# Patient Record
Sex: MALE | HISPANIC OR LATINO | Employment: OTHER | URBAN - METROPOLITAN AREA
[De-identification: names, ages, dates, MRNs, and addresses within clinical notes are randomized per-mention and may not be internally consistent; named-entity substitution may affect disease eponyms.]

---

## 2023-06-15 ENCOUNTER — OFFICE VISIT (OUTPATIENT)
Dept: PRIMARY CARE | Facility: CLINIC | Age: 48
End: 2023-06-15
Payer: COMMERCIAL

## 2023-06-15 VITALS
DIASTOLIC BLOOD PRESSURE: 88 MMHG | WEIGHT: 226 LBS | SYSTOLIC BLOOD PRESSURE: 128 MMHG | HEART RATE: 112 BPM | OXYGEN SATURATION: 94 % | BODY MASS INDEX: 36.32 KG/M2 | TEMPERATURE: 97.5 F | HEIGHT: 66 IN | RESPIRATION RATE: 16 BRPM

## 2023-06-15 DIAGNOSIS — R06.83 SNORING: ICD-10-CM

## 2023-06-15 DIAGNOSIS — E66.09 CLASS 2 OBESITY DUE TO EXCESS CALORIES WITHOUT SERIOUS COMORBIDITY WITH BODY MASS INDEX (BMI) OF 36.0 TO 36.9 IN ADULT: ICD-10-CM

## 2023-06-15 DIAGNOSIS — R20.2 TINGLING OF LEFT UPPER EXTREMITY: Primary | ICD-10-CM

## 2023-06-15 DIAGNOSIS — Z79.899 MEDICAL MARIJUANA USE: ICD-10-CM

## 2023-06-15 DIAGNOSIS — Z00.00 HEALTHCARE MAINTENANCE: ICD-10-CM

## 2023-06-15 DIAGNOSIS — G89.29 CHRONIC PAIN OF LEFT LOWER EXTREMITY: ICD-10-CM

## 2023-06-15 DIAGNOSIS — Z13.220 SCREENING, LIPID: ICD-10-CM

## 2023-06-15 DIAGNOSIS — Z12.5 SCREENING FOR PROSTATE CANCER: ICD-10-CM

## 2023-06-15 DIAGNOSIS — M79.605 CHRONIC PAIN OF LEFT LOWER EXTREMITY: ICD-10-CM

## 2023-06-15 DIAGNOSIS — R40.0 DAYTIME SOMNOLENCE: ICD-10-CM

## 2023-06-15 PROBLEM — K21.9 GASTROESOPHAGEAL REFLUX DISEASE WITHOUT ESOPHAGITIS: Status: ACTIVE | Noted: 2022-10-20

## 2023-06-15 PROBLEM — E66.812 CLASS 2 OBESITY DUE TO EXCESS CALORIES WITHOUT SERIOUS COMORBIDITY WITH BODY MASS INDEX (BMI) OF 36.0 TO 36.9 IN ADULT: Status: ACTIVE | Noted: 2023-06-15

## 2023-06-15 PROCEDURE — 93000 ELECTROCARDIOGRAM COMPLETE: CPT | Performed by: FAMILY MEDICINE

## 2023-06-15 PROCEDURE — 99203 OFFICE O/P NEW LOW 30 MIN: CPT | Performed by: FAMILY MEDICINE

## 2023-06-15 PROCEDURE — 3008F BODY MASS INDEX DOCD: CPT | Performed by: FAMILY MEDICINE

## 2023-06-15 PROCEDURE — 1036F TOBACCO NON-USER: CPT | Performed by: FAMILY MEDICINE

## 2023-06-15 RX ORDER — MULTIVITAMIN
1 TABLET ORAL DAILY
COMMUNITY

## 2023-06-15 RX ORDER — NAPROXEN 500 MG/1
500 TABLET ORAL 2 TIMES DAILY PRN
Qty: 40 TABLET | Refills: 0 | Status: SHIPPED | OUTPATIENT
Start: 2023-06-15 | End: 2023-09-01 | Stop reason: ALTCHOICE

## 2023-06-15 ASSESSMENT — ENCOUNTER SYMPTOMS
CHEST TIGHTNESS: 0
BACK PAIN: 0
BLOOD IN STOOL: 0
DIZZINESS: 0
SHORTNESS OF BREATH: 0
ABDOMINAL DISTENTION: 0
ARTHRALGIAS: 0
FEVER: 0
ADENOPATHY: 0
CHILLS: 0
NERVOUS/ANXIOUS: 0
HEADACHES: 0
CONSTIPATION: 0
DIARRHEA: 0
EYE PAIN: 0
DYSURIA: 0
ABDOMINAL PAIN: 0
DYSPHORIC MOOD: 0
COUGH: 0
WEAKNESS: 0
EYE REDNESS: 0
SORE THROAT: 0
BRUISES/BLEEDS EASILY: 0
APPETITE CHANGE: 0
DIFFICULTY URINATING: 0
FATIGUE: 0
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
## 2023-06-15 NOTE — PROGRESS NOTES
"Subjective   Patient ID: Jose Osei is a 47 y.o. male who presents for New Patient Visit.    HPI     Review of Systems    Objective   /84   Pulse (!) 112   Temp 36.4 °C (97.5 °F)   Resp 16   Ht 1.676 m (5' 6\")   Wt 103 kg (226 lb)   SpO2 94%   BMI 36.48 kg/m²     Physical Exam    Assessment/Plan          "

## 2023-06-23 ENCOUNTER — LAB (OUTPATIENT)
Dept: LAB | Facility: LAB | Age: 48
End: 2023-06-23
Payer: COMMERCIAL

## 2023-06-23 DIAGNOSIS — Z00.00 HEALTHCARE MAINTENANCE: ICD-10-CM

## 2023-06-23 DIAGNOSIS — Z13.220 SCREENING, LIPID: ICD-10-CM

## 2023-06-23 DIAGNOSIS — E78.00 ELEVATED LDL CHOLESTEROL LEVEL: ICD-10-CM

## 2023-06-23 DIAGNOSIS — R73.03 PREDIABETES: ICD-10-CM

## 2023-06-23 DIAGNOSIS — R40.0 DAYTIME SOMNOLENCE: ICD-10-CM

## 2023-06-23 DIAGNOSIS — Z12.5 SCREENING FOR PROSTATE CANCER: ICD-10-CM

## 2023-06-23 DIAGNOSIS — R31.21 ASYMPTOMATIC MICROSCOPIC HEMATURIA: Primary | ICD-10-CM

## 2023-06-23 LAB
ALANINE AMINOTRANSFERASE (SGPT) (U/L) IN SER/PLAS: 26 U/L (ref 10–52)
ALBUMIN (G/DL) IN SER/PLAS: 4.2 G/DL (ref 3.4–5)
ALKALINE PHOSPHATASE (U/L) IN SER/PLAS: 84 U/L (ref 33–120)
ANION GAP IN SER/PLAS: 13 MMOL/L (ref 10–20)
APPEARANCE, URINE: CLEAR
ASPARTATE AMINOTRANSFERASE (SGOT) (U/L) IN SER/PLAS: 19 U/L (ref 9–39)
BILIRUBIN TOTAL (MG/DL) IN SER/PLAS: 0.6 MG/DL (ref 0–1.2)
BILIRUBIN, URINE: NEGATIVE
BLOOD, URINE: ABNORMAL
CALCIUM (MG/DL) IN SER/PLAS: 9.3 MG/DL (ref 8.6–10.6)
CARBON DIOXIDE, TOTAL (MMOL/L) IN SER/PLAS: 26 MMOL/L (ref 21–32)
CHLORIDE (MMOL/L) IN SER/PLAS: 104 MMOL/L (ref 98–107)
CHOLESTEROL (MG/DL) IN SER/PLAS: 208 MG/DL (ref 0–199)
CHOLESTEROL IN HDL (MG/DL) IN SER/PLAS: 43.7 MG/DL
CHOLESTEROL/HDL RATIO: 4.8
COBALAMIN (VITAMIN B12) (PG/ML) IN SER/PLAS: 734 PG/ML (ref 211–911)
COLOR, URINE: YELLOW
CREATININE (MG/DL) IN SER/PLAS: 1.05 MG/DL (ref 0.5–1.3)
ERYTHROCYTE DISTRIBUTION WIDTH (RATIO) BY AUTOMATED COUNT: 13.4 % (ref 11.5–14.5)
ERYTHROCYTE MEAN CORPUSCULAR HEMOGLOBIN CONCENTRATION (G/DL) BY AUTOMATED: 31.7 G/DL (ref 32–36)
ERYTHROCYTE MEAN CORPUSCULAR VOLUME (FL) BY AUTOMATED COUNT: 92 FL (ref 80–100)
ERYTHROCYTES (10*6/UL) IN BLOOD BY AUTOMATED COUNT: 4.65 X10E12/L (ref 4.5–5.9)
GFR MALE: 87 ML/MIN/1.73M2
GLUCOSE (MG/DL) IN SER/PLAS: 101 MG/DL (ref 74–99)
GLUCOSE, URINE: NEGATIVE MG/DL
HEMATOCRIT (%) IN BLOOD BY AUTOMATED COUNT: 42.6 % (ref 41–52)
HEMOGLOBIN (G/DL) IN BLOOD: 13.5 G/DL (ref 13.5–17.5)
HEPATITIS C VIRUS AB PRESENCE IN SERUM: NONREACTIVE
KETONES, URINE: NEGATIVE MG/DL
LDL: 145 MG/DL (ref 0–99)
LEUKOCYTE ESTERASE, URINE: NEGATIVE
LEUKOCYTES (10*3/UL) IN BLOOD BY AUTOMATED COUNT: 5.8 X10E9/L (ref 4.4–11.3)
MUCUS, URINE: NORMAL /LPF
NITRITE, URINE: NEGATIVE
NRBC (PER 100 WBCS) BY AUTOMATED COUNT: 0 /100 WBC (ref 0–0)
PH, URINE: 6 (ref 5–8)
PLATELETS (10*3/UL) IN BLOOD AUTOMATED COUNT: 371 X10E9/L (ref 150–450)
POTASSIUM (MMOL/L) IN SER/PLAS: 4.5 MMOL/L (ref 3.5–5.3)
PROSTATE SPECIFIC ANTIGEN,SCREEN: 1.02 NG/ML (ref 0–4)
PROTEIN TOTAL: 7.2 G/DL (ref 6.4–8.2)
PROTEIN, URINE: NEGATIVE MG/DL
RBC, URINE: 3 /HPF (ref 0–5)
SODIUM (MMOL/L) IN SER/PLAS: 138 MMOL/L (ref 136–145)
SPECIFIC GRAVITY, URINE: 1.02 (ref 1–1.03)
THYROTROPIN (MIU/L) IN SER/PLAS BY DETECTION LIMIT <= 0.05 MIU/L: 1.27 MIU/L (ref 0.44–3.98)
TRIGLYCERIDE (MG/DL) IN SER/PLAS: 98 MG/DL (ref 0–149)
UREA NITROGEN (MG/DL) IN SER/PLAS: 14 MG/DL (ref 6–23)
UROBILINOGEN, URINE: <2 MG/DL (ref 0–1.9)
VLDL: 20 MG/DL (ref 0–40)
WBC, URINE: 1 /HPF (ref 0–5)

## 2023-06-23 PROCEDURE — 82607 VITAMIN B-12: CPT

## 2023-06-23 PROCEDURE — 85027 COMPLETE CBC AUTOMATED: CPT

## 2023-06-23 PROCEDURE — 80053 COMPREHEN METABOLIC PANEL: CPT

## 2023-06-23 PROCEDURE — 84153 ASSAY OF PSA TOTAL: CPT

## 2023-06-23 PROCEDURE — 81001 URINALYSIS AUTO W/SCOPE: CPT

## 2023-06-23 PROCEDURE — 86803 HEPATITIS C AB TEST: CPT

## 2023-06-23 PROCEDURE — 84443 ASSAY THYROID STIM HORMONE: CPT

## 2023-06-23 PROCEDURE — 36415 COLL VENOUS BLD VENIPUNCTURE: CPT

## 2023-06-23 PROCEDURE — 80061 LIPID PANEL: CPT

## 2023-06-26 ENCOUNTER — TELEPHONE (OUTPATIENT)
Dept: PRIMARY CARE | Facility: CLINIC | Age: 48
End: 2023-06-26
Payer: COMMERCIAL

## 2023-06-26 NOTE — TELEPHONE ENCOUNTER
----- Message from Geoff Lora MD sent at 6/23/2023  3:03 PM EDT -----  FBS and LDL are elevated - recommend low carb diet, healthy snacking and regular exercise. There is low level of blood detectable in urine, recommend repeat early morning UA. Other labs look good.   ----- Message -----  From: Lab, Background User  Sent: 6/23/2023  12:43 PM EDT  To: Geoff Lora MD

## 2023-07-07 ENCOUNTER — LAB (OUTPATIENT)
Dept: LAB | Facility: LAB | Age: 48
End: 2023-07-07
Payer: COMMERCIAL

## 2023-07-07 DIAGNOSIS — R31.21 ASYMPTOMATIC MICROSCOPIC HEMATURIA: ICD-10-CM

## 2023-07-07 LAB
APPEARANCE, URINE: CLEAR
BILIRUBIN, URINE: NEGATIVE
BLOOD, URINE: NEGATIVE
COLOR, URINE: YELLOW
GLUCOSE, URINE: NEGATIVE MG/DL
KETONES, URINE: NEGATIVE MG/DL
LEUKOCYTE ESTERASE, URINE: NEGATIVE
NITRITE, URINE: NEGATIVE
PH, URINE: 5 (ref 5–8)
PROTEIN, URINE: NEGATIVE MG/DL
SPECIFIC GRAVITY, URINE: 1.02 (ref 1–1.03)
UROBILINOGEN, URINE: <2 MG/DL (ref 0–1.9)

## 2023-07-07 PROCEDURE — 81003 URINALYSIS AUTO W/O SCOPE: CPT

## 2023-09-01 ENCOUNTER — OFFICE VISIT (OUTPATIENT)
Dept: PRIMARY CARE | Facility: CLINIC | Age: 48
End: 2023-09-01
Payer: COMMERCIAL

## 2023-09-01 VITALS
HEART RATE: 84 BPM | SYSTOLIC BLOOD PRESSURE: 122 MMHG | WEIGHT: 220 LBS | DIASTOLIC BLOOD PRESSURE: 78 MMHG | RESPIRATION RATE: 12 BRPM | HEIGHT: 66 IN | BODY MASS INDEX: 35.36 KG/M2 | OXYGEN SATURATION: 95 %

## 2023-09-01 DIAGNOSIS — E66.01 CLASS 2 SEVERE OBESITY DUE TO EXCESS CALORIES WITH SERIOUS COMORBIDITY AND BODY MASS INDEX (BMI) OF 35.0 TO 35.9 IN ADULT (MULTI): ICD-10-CM

## 2023-09-01 DIAGNOSIS — E78.00 ELEVATED LDL CHOLESTEROL LEVEL: ICD-10-CM

## 2023-09-01 DIAGNOSIS — Z12.11 ENCOUNTER FOR SCREENING FOR MALIGNANT NEOPLASM OF COLON: ICD-10-CM

## 2023-09-01 DIAGNOSIS — R20.2 TINGLING OF LEFT UPPER EXTREMITY: ICD-10-CM

## 2023-09-01 DIAGNOSIS — Z00.00 HEALTHCARE MAINTENANCE: Primary | ICD-10-CM

## 2023-09-01 DIAGNOSIS — R73.01 IFG (IMPAIRED FASTING GLUCOSE): ICD-10-CM

## 2023-09-01 PROCEDURE — 1036F TOBACCO NON-USER: CPT | Performed by: FAMILY MEDICINE

## 2023-09-01 PROCEDURE — 99396 PREV VISIT EST AGE 40-64: CPT | Performed by: FAMILY MEDICINE

## 2023-09-01 PROCEDURE — 3008F BODY MASS INDEX DOCD: CPT | Performed by: FAMILY MEDICINE

## 2023-09-01 RX ORDER — NAPROXEN 500 MG/1
500 TABLET ORAL 2 TIMES DAILY PRN
Qty: 40 TABLET | Refills: 0 | Status: SHIPPED | OUTPATIENT
Start: 2023-09-01 | End: 2023-11-30

## 2023-09-01 ASSESSMENT — ENCOUNTER SYMPTOMS
SHORTNESS OF BREATH: 0
ABDOMINAL PAIN: 0
DIZZINESS: 0
DIFFICULTY URINATING: 0
DYSURIA: 0
DIARRHEA: 0
NERVOUS/ANXIOUS: 0
WEAKNESS: 0
ARTHRALGIAS: 0
EYE PAIN: 0
COUGH: 0
CONSTIPATION: 0
CHEST TIGHTNESS: 0
FATIGUE: 0
BRUISES/BLEEDS EASILY: 0
EYE REDNESS: 0
SORE THROAT: 0
BLOOD IN STOOL: 0
APPETITE CHANGE: 0
ABDOMINAL DISTENTION: 0
ADENOPATHY: 0
HEADACHES: 0
FEVER: 0
DYSPHORIC MOOD: 0
CHILLS: 0
BACK PAIN: 0

## 2023-09-01 NOTE — PROGRESS NOTES
"Subjective   Patient ID: Jose Osei is a 48 y.o. male who presents for Annual Exam.    HPI     Review of Systems    Objective   /84   Pulse 84   Resp 12   Ht 1.676 m (5' 6\")   Wt 99.8 kg (220 lb)   SpO2 95%   BMI 35.51 kg/m²     Physical Exam    Assessment/Plan          "

## 2023-09-01 NOTE — PROGRESS NOTES
"Subjective   Patient ID: Jose Osei is a 48 y.o. male who presents for Annual Exam.  PMHX, PSHx, Fam hx, and Social hx reviewed.   New concerns - L arm tingling improved, comes and goes after straining after working on downed trees. 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  Vaccines - TDAP/MMR shots recommended  Dentist seen at least yearly yes  Vision concerns none  Hearing concerns none  Diet is working on eating healthier.   Smoker - no  Alcohol use - 2-3 drinks per week  Exercising 2-3 days per week.   Sexually active - yes, no issues   Colonoscopy due           Review of Systems   Constitutional:  Negative for appetite change, chills, fatigue and fever.   HENT:  Negative for congestion, hearing loss and sore throat.    Eyes:  Negative for pain, redness and visual disturbance.   Respiratory:  Negative for cough, chest tightness and shortness of breath.    Cardiovascular:  Negative for chest pain and leg swelling.   Gastrointestinal:  Negative for abdominal distention, abdominal pain, blood in stool, constipation and diarrhea.   Genitourinary:  Negative for difficulty urinating and dysuria.   Musculoskeletal:  Negative for arthralgias and back pain.   Skin:  Negative for rash.   Neurological:  Negative for dizziness, weakness and headaches.   Hematological:  Negative for adenopathy. Does not bruise/bleed easily.   Psychiatric/Behavioral:  Negative for dysphoric mood. The patient is not nervous/anxious.        Objective   /78   Pulse 84   Resp 12   Ht 1.676 m (5' 6\")   Wt 99.8 kg (220 lb)   SpO2 95%   BMI 35.51 kg/m²    Physical Exam  Constitutional:       General: He is not in acute distress.     Appearance: Normal appearance. He is obese. He is not ill-appearing.   HENT:      Head: Normocephalic and atraumatic.      Right Ear: Tympanic membrane, ear canal and external ear normal.      Left Ear: Tympanic membrane, ear canal and external ear normal.      Nose: Nose normal.      Mouth/Throat:      " Mouth: Mucous membranes are moist.      Pharynx: No oropharyngeal exudate or posterior oropharyngeal erythema.   Eyes:      Extraocular Movements: Extraocular movements intact.      Conjunctiva/sclera: Conjunctivae normal.      Pupils: Pupils are equal, round, and reactive to light.   Neck:      Vascular: No carotid bruit.   Cardiovascular:      Rate and Rhythm: Normal rate and regular rhythm.      Heart sounds: Normal heart sounds. No murmur heard.  Pulmonary:      Breath sounds: Normal breath sounds. No wheezing, rhonchi or rales.   Abdominal:      General: Bowel sounds are normal. There is no distension.      Palpations: Abdomen is soft. There is no mass.      Tenderness: There is no abdominal tenderness.   Genitourinary:     Prostate: Not enlarged and no nodules present.      Rectum: Guaiac result negative.   Musculoskeletal:         General: No swelling or deformity.      Cervical back: Neck supple. No tenderness.   Lymphadenopathy:      Cervical: No cervical adenopathy.   Skin:     General: Skin is warm and dry.      Findings: No lesion or rash.   Neurological:      Mental Status: He is alert and oriented to person, place, and time.      Sensory: No sensory deficit.      Motor: No weakness.      Coordination: Coordination normal.      Deep Tendon Reflexes: Reflexes normal.   Psychiatric:         Mood and Affect: Mood normal.         Behavior: Behavior normal.         Judgment: Judgment normal.           Assessment/Plan   Diagnoses and all orders for this visit:  Healthcare maintenance - Measles and Tetanus shots recommended. Other vaccines current. Labs reviewed and discussed.  Tingling of left upper extremity - continue Naproxen as needed, if not continuing to improve would consider EMG.  Weight - recommend low carb diet, increasing water intake to at least 64oz/day, healthy snacking between meals, and regular cardiovascular exercise 150mins/week. Goal for weight loss is 1-2# per week.   Follow up in 1year,  30min physical

## 2024-04-24 DIAGNOSIS — Z12.11 SPECIAL SCREENING FOR MALIGNANT NEOPLASMS, COLON: ICD-10-CM

## 2024-04-24 RX ORDER — POLYETHYLENE GLYCOL 3350, SODIUM SULFATE ANHYDROUS, SODIUM BICARBONATE, SODIUM CHLORIDE, POTASSIUM CHLORIDE 236; 22.74; 6.74; 5.86; 2.97 G/4L; G/4L; G/4L; G/4L; G/4L
POWDER, FOR SOLUTION ORAL
Qty: 4000 ML | Refills: 0 | Status: SHIPPED | OUTPATIENT
Start: 2024-04-24

## 2024-05-22 ENCOUNTER — OFFICE VISIT (OUTPATIENT)
Dept: GASTROENTEROLOGY | Facility: EXTERNAL LOCATION | Age: 49
End: 2024-05-22
Payer: COMMERCIAL

## 2024-05-22 DIAGNOSIS — Z12.11 ENCOUNTER FOR SCREENING FOR MALIGNANT NEOPLASM OF COLON: ICD-10-CM

## 2024-05-22 DIAGNOSIS — K57.30 DIVERTICULOSIS OF LARGE INTESTINE WITHOUT DIVERTICULITIS: ICD-10-CM

## 2024-05-22 DIAGNOSIS — K64.8 INTERNAL HEMORRHOIDS: Primary | ICD-10-CM

## 2024-05-22 PROCEDURE — 45378 DIAGNOSTIC COLONOSCOPY: CPT | Performed by: INTERNAL MEDICINE

## 2024-05-22 PROCEDURE — 3008F BODY MASS INDEX DOCD: CPT | Performed by: INTERNAL MEDICINE

## 2024-09-03 ENCOUNTER — APPOINTMENT (OUTPATIENT)
Dept: PRIMARY CARE | Facility: CLINIC | Age: 49
End: 2024-09-03
Payer: COMMERCIAL

## 2024-09-12 ENCOUNTER — APPOINTMENT (OUTPATIENT)
Dept: PRIMARY CARE | Facility: CLINIC | Age: 49
End: 2024-09-12
Payer: COMMERCIAL

## 2024-09-12 VITALS
HEART RATE: 84 BPM | OXYGEN SATURATION: 97 % | BODY MASS INDEX: 37.12 KG/M2 | SYSTOLIC BLOOD PRESSURE: 124 MMHG | WEIGHT: 231 LBS | RESPIRATION RATE: 12 BRPM | HEIGHT: 66 IN | DIASTOLIC BLOOD PRESSURE: 80 MMHG

## 2024-09-12 DIAGNOSIS — M67.449 DIGITAL MUCINOUS CYST OF FINGER: ICD-10-CM

## 2024-09-12 DIAGNOSIS — E78.00 ELEVATED LDL CHOLESTEROL LEVEL: ICD-10-CM

## 2024-09-12 DIAGNOSIS — Z12.11 SPECIAL SCREENING FOR MALIGNANT NEOPLASMS, COLON: ICD-10-CM

## 2024-09-12 DIAGNOSIS — E66.09 CLASS 2 OBESITY DUE TO EXCESS CALORIES WITHOUT SERIOUS COMORBIDITY WITH BODY MASS INDEX (BMI) OF 37.0 TO 37.9 IN ADULT: ICD-10-CM

## 2024-09-12 DIAGNOSIS — D17.1 LIPOMA OF TORSO: ICD-10-CM

## 2024-09-12 DIAGNOSIS — R73.03 PREDIABETES: ICD-10-CM

## 2024-09-12 DIAGNOSIS — Z00.00 HEALTHCARE MAINTENANCE: Primary | ICD-10-CM

## 2024-09-12 PROBLEM — E66.812 CLASS 2 OBESITY DUE TO EXCESS CALORIES WITHOUT SERIOUS COMORBIDITY WITH BODY MASS INDEX (BMI) OF 37.0 TO 37.9 IN ADULT: Status: ACTIVE | Noted: 2024-09-12

## 2024-09-12 PROCEDURE — 1036F TOBACCO NON-USER: CPT | Performed by: FAMILY MEDICINE

## 2024-09-12 PROCEDURE — 99396 PREV VISIT EST AGE 40-64: CPT | Performed by: FAMILY MEDICINE

## 2024-09-12 PROCEDURE — 3008F BODY MASS INDEX DOCD: CPT | Performed by: FAMILY MEDICINE

## 2024-09-12 ASSESSMENT — ENCOUNTER SYMPTOMS
BLOOD IN STOOL: 0
CHILLS: 0
WEAKNESS: 0
CHEST TIGHTNESS: 0
ADENOPATHY: 0
FEVER: 0
DIARRHEA: 0
BRUISES/BLEEDS EASILY: 0
DIFFICULTY URINATING: 0
NERVOUS/ANXIOUS: 0
APPETITE CHANGE: 0
DYSPHORIC MOOD: 0
ARTHRALGIAS: 0
FATIGUE: 0
SHORTNESS OF BREATH: 0
DIZZINESS: 0
EYE PAIN: 0
SORE THROAT: 0
BACK PAIN: 0
EYE REDNESS: 0
CONSTIPATION: 0
HEADACHES: 0
ABDOMINAL PAIN: 0
DYSURIA: 0
COUGH: 0
ABDOMINAL DISTENTION: 0

## 2024-09-12 NOTE — PROGRESS NOTES
"Subjective   Patient ID: Jose Osei is a 49 y.o. male who presents for Annual Exam.  PMHX, PSHx, Fam hx, and Social hx reviewed.   New concerns none  Vaccines TDAP, MMR and Shingles vaccines recommended  Dentist seen at least yearly yes  Vision concerns none  Hearing concerns none  Diet is not overall healthy.   Smoker - no tobacco  Using MJ daily  Lung CT/screening - NA  AAA screening - NA  Alcohol use - 2-3 drinks per week  Exercising 1-2 days per week.   Sexually active - yes, no issues  Colonoscopy 2024, current                 Review of Systems   Constitutional:  Negative for appetite change, chills, fatigue and fever.   HENT:  Negative for congestion, hearing loss and sore throat.    Eyes:  Negative for pain, redness and visual disturbance.   Respiratory:  Negative for cough, chest tightness and shortness of breath.    Cardiovascular:  Negative for chest pain and leg swelling.   Gastrointestinal:  Negative for abdominal distention, abdominal pain, blood in stool, constipation and diarrhea.   Genitourinary:  Negative for difficulty urinating and dysuria.   Musculoskeletal:  Negative for arthralgias and back pain.   Skin:  Negative for rash.   Neurological:  Negative for dizziness, weakness and headaches.   Hematological:  Negative for adenopathy. Does not bruise/bleed easily.   Psychiatric/Behavioral:  Negative for dysphoric mood. The patient is not nervous/anxious.        Objective   /80   Pulse 84   Resp 12   Ht 1.676 m (5' 6\")   Wt 105 kg (231 lb)   SpO2 97%   BMI 37.28 kg/m²    Physical Exam  Constitutional:       General: He is not in acute distress.     Appearance: Normal appearance. He is not ill-appearing.   HENT:      Head: Normocephalic and atraumatic.      Right Ear: Tympanic membrane, ear canal and external ear normal.      Left Ear: Tympanic membrane, ear canal and external ear normal.      Nose: Nose normal.      Mouth/Throat:      Mouth: Mucous membranes are moist.      " Pharynx: No oropharyngeal exudate or posterior oropharyngeal erythema.   Eyes:      Extraocular Movements: Extraocular movements intact.      Conjunctiva/sclera: Conjunctivae normal.      Pupils: Pupils are equal, round, and reactive to light.   Neck:      Thyroid: No thyroid mass or thyromegaly.      Vascular: No carotid bruit.   Cardiovascular:      Rate and Rhythm: Normal rate and regular rhythm.      Heart sounds: Normal heart sounds. No murmur heard.  Pulmonary:      Breath sounds: Normal breath sounds. No wheezing, rhonchi or rales.   Abdominal:      General: Bowel sounds are normal. There is no distension.      Palpations: Abdomen is soft. There is no mass.      Tenderness: There is no abdominal tenderness.   Musculoskeletal:         General: No swelling or deformity.      Cervical back: Neck supple. No tenderness.   Lymphadenopathy:      Cervical: No cervical adenopathy.   Skin:     General: Skin is warm and dry.      Findings: No lesion or rash.   Neurological:      Mental Status: He is alert and oriented to person, place, and time.      Sensory: No sensory deficit.      Motor: No weakness.      Coordination: Coordination normal.      Deep Tendon Reflexes: Reflexes normal.   Psychiatric:         Mood and Affect: Mood normal.         Behavior: Behavior normal.         Judgment: Judgment normal.           Assessment/Plan   Diagnoses and all orders for this visit:  Healthcare maintenance - TDAP, MMR and Shingles vaccines recommended. Rechecking fasting labs and ordering CAC score. Colonoscopy current.   Prediabetes / Weight - recommend low carb diet, increasing water intake to at least 64oz/day, healthy snacking between meals, and regular cardiovascular exercise 150mins/week. Goal for weight loss is 1-2# per week.   Elevated LDL cholesterol level - monitor with labs.  Lipoma of torso/Digital mucinous cyst of finger - chronic, unchanged for 20+yrs. Monitor.    Follow up in 1 year, 30min for physical

## 2024-09-12 NOTE — PROGRESS NOTES
"Subjective   Patient ID: Jose Osei is a 49 y.o. male who presents for Annual Exam.    HPI     Review of Systems    Objective   /80   Pulse 84   Resp 12   Ht 1.676 m (5' 6\")   Wt 105 kg (231 lb)   SpO2 97%   BMI 37.28 kg/m²     Physical Exam    Assessment/Plan          "

## 2024-10-31 ENCOUNTER — LAB (OUTPATIENT)
Dept: LAB | Facility: LAB | Age: 49
End: 2024-10-31
Payer: COMMERCIAL

## 2024-10-31 DIAGNOSIS — R73.03 PREDIABETES: ICD-10-CM

## 2024-10-31 DIAGNOSIS — Z00.00 HEALTHCARE MAINTENANCE: ICD-10-CM

## 2024-10-31 LAB
ALBUMIN SERPL BCP-MCNC: 4.2 G/DL (ref 3.4–5)
ALP SERPL-CCNC: 77 U/L (ref 33–120)
ALT SERPL W P-5'-P-CCNC: 22 U/L (ref 10–52)
ANION GAP SERPL CALC-SCNC: 12 MMOL/L (ref 10–20)
APPEARANCE UR: CLEAR
AST SERPL W P-5'-P-CCNC: 14 U/L (ref 9–39)
BILIRUB SERPL-MCNC: 0.4 MG/DL (ref 0–1.2)
BILIRUB UR STRIP.AUTO-MCNC: NEGATIVE MG/DL
BUN SERPL-MCNC: 16 MG/DL (ref 6–23)
CALCIUM SERPL-MCNC: 9.5 MG/DL (ref 8.6–10.6)
CHLORIDE SERPL-SCNC: 105 MMOL/L (ref 98–107)
CHOLEST SERPL-MCNC: 220 MG/DL (ref 0–199)
CHOLESTEROL/HDL RATIO: 5.2
CO2 SERPL-SCNC: 27 MMOL/L (ref 21–32)
COLOR UR: NORMAL
CREAT SERPL-MCNC: 1.05 MG/DL (ref 0.5–1.3)
EGFRCR SERPLBLD CKD-EPI 2021: 86 ML/MIN/1.73M*2
ERYTHROCYTE [DISTWIDTH] IN BLOOD BY AUTOMATED COUNT: 13 % (ref 11.5–14.5)
EST. AVERAGE GLUCOSE BLD GHB EST-MCNC: 126 MG/DL
GLUCOSE SERPL-MCNC: 111 MG/DL (ref 74–99)
GLUCOSE UR STRIP.AUTO-MCNC: NORMAL MG/DL
HBA1C MFR BLD: 6 %
HCT VFR BLD AUTO: 45.3 % (ref 41–52)
HDLC SERPL-MCNC: 42.1 MG/DL
HGB BLD-MCNC: 14.4 G/DL (ref 13.5–17.5)
HOLD SPECIMEN: NORMAL
KETONES UR STRIP.AUTO-MCNC: NEGATIVE MG/DL
LDLC SERPL CALC-MCNC: 141 MG/DL
LEUKOCYTE ESTERASE UR QL STRIP.AUTO: NEGATIVE
MCH RBC QN AUTO: 29.9 PG (ref 26–34)
MCHC RBC AUTO-ENTMCNC: 31.8 G/DL (ref 32–36)
MCV RBC AUTO: 94 FL (ref 80–100)
NITRITE UR QL STRIP.AUTO: NEGATIVE
NON HDL CHOLESTEROL: 178 MG/DL (ref 0–149)
NRBC BLD-RTO: 0 /100 WBCS (ref 0–0)
PH UR STRIP.AUTO: 6 [PH]
PLATELET # BLD AUTO: 305 X10*3/UL (ref 150–450)
POTASSIUM SERPL-SCNC: 4.3 MMOL/L (ref 3.5–5.3)
PROT SERPL-MCNC: 7.2 G/DL (ref 6.4–8.2)
PROT UR STRIP.AUTO-MCNC: NEGATIVE MG/DL
PSA SERPL-MCNC: 1.07 NG/ML
RBC # BLD AUTO: 4.81 X10*6/UL (ref 4.5–5.9)
RBC # UR STRIP.AUTO: NEGATIVE /UL
SODIUM SERPL-SCNC: 140 MMOL/L (ref 136–145)
SP GR UR STRIP.AUTO: 1.02
TRIGL SERPL-MCNC: 185 MG/DL (ref 0–149)
UROBILINOGEN UR STRIP.AUTO-MCNC: NORMAL MG/DL
VLDL: 37 MG/DL (ref 0–40)
WBC # BLD AUTO: 6.3 X10*3/UL (ref 4.4–11.3)

## 2024-10-31 PROCEDURE — 81003 URINALYSIS AUTO W/O SCOPE: CPT

## 2024-10-31 PROCEDURE — 83036 HEMOGLOBIN GLYCOSYLATED A1C: CPT

## 2024-10-31 PROCEDURE — 85027 COMPLETE CBC AUTOMATED: CPT

## 2024-10-31 PROCEDURE — 84153 ASSAY OF PSA TOTAL: CPT

## 2024-10-31 PROCEDURE — 80053 COMPREHEN METABOLIC PANEL: CPT

## 2024-10-31 PROCEDURE — 80061 LIPID PANEL: CPT

## 2024-10-31 PROCEDURE — 36415 COLL VENOUS BLD VENIPUNCTURE: CPT

## 2024-11-05 ENCOUNTER — TELEPHONE (OUTPATIENT)
Dept: PRIMARY CARE | Facility: CLINIC | Age: 49
End: 2024-11-05
Payer: COMMERCIAL

## 2024-11-05 NOTE — TELEPHONE ENCOUNTER
----- Message from Geoff Lora sent at 10/31/2024 12:59 PM EDT -----  Blood sugar is elevated in the prediabetes range, LDL and TG also elevated. Recommend low carb diet, healthy snacking, and regular cardiovascular exercise. Other labs look good.  ----- Message -----  From: Lab, Background User  Sent: 10/31/2024  12:13 PM EDT  To: Geoff Lora MD

## 2024-11-14 ENCOUNTER — OFFICE VISIT (OUTPATIENT)
Dept: PRIMARY CARE | Facility: CLINIC | Age: 49
End: 2024-11-14
Payer: COMMERCIAL

## 2024-11-14 DIAGNOSIS — Z23 ENCOUNTER FOR IMMUNIZATION: ICD-10-CM

## 2024-11-14 PROCEDURE — 90750 HZV VACC RECOMBINANT IM: CPT | Performed by: FAMILY MEDICINE

## 2024-11-14 PROCEDURE — 90471 IMMUNIZATION ADMIN: CPT | Performed by: FAMILY MEDICINE

## 2025-01-02 ENCOUNTER — HOSPITAL ENCOUNTER (OUTPATIENT)
Dept: RADIOLOGY | Facility: CLINIC | Age: 50
Discharge: HOME | End: 2025-01-02
Payer: COMMERCIAL

## 2025-01-02 ENCOUNTER — OFFICE VISIT (OUTPATIENT)
Dept: PRIMARY CARE | Facility: CLINIC | Age: 50
End: 2025-01-02
Payer: COMMERCIAL

## 2025-01-02 VITALS
HEART RATE: 104 BPM | RESPIRATION RATE: 12 BRPM | OXYGEN SATURATION: 96 % | SYSTOLIC BLOOD PRESSURE: 138 MMHG | HEIGHT: 66 IN | BODY MASS INDEX: 37.12 KG/M2 | DIASTOLIC BLOOD PRESSURE: 84 MMHG | WEIGHT: 231 LBS

## 2025-01-02 DIAGNOSIS — R20.0 NUMBNESS AND TINGLING OF RIGHT ARM: ICD-10-CM

## 2025-01-02 DIAGNOSIS — Z00.00 HEALTHCARE MAINTENANCE: ICD-10-CM

## 2025-01-02 DIAGNOSIS — R20.2 NUMBNESS AND TINGLING OF RIGHT ARM: ICD-10-CM

## 2025-01-02 DIAGNOSIS — R29.898 RIGHT ARM WEAKNESS: ICD-10-CM

## 2025-01-02 DIAGNOSIS — M54.9 UPPER BACK PAIN ON RIGHT SIDE: Primary | ICD-10-CM

## 2025-01-02 DIAGNOSIS — M54.9 UPPER BACK PAIN ON RIGHT SIDE: ICD-10-CM

## 2025-01-02 PROCEDURE — 72050 X-RAY EXAM NECK SPINE 4/5VWS: CPT | Performed by: STUDENT IN AN ORGANIZED HEALTH CARE EDUCATION/TRAINING PROGRAM

## 2025-01-02 PROCEDURE — 3008F BODY MASS INDEX DOCD: CPT | Performed by: FAMILY MEDICINE

## 2025-01-02 PROCEDURE — 72050 X-RAY EXAM NECK SPINE 4/5VWS: CPT

## 2025-01-02 PROCEDURE — 99213 OFFICE O/P EST LOW 20 MIN: CPT | Performed by: FAMILY MEDICINE

## 2025-01-02 PROCEDURE — 1036F TOBACCO NON-USER: CPT | Performed by: FAMILY MEDICINE

## 2025-01-02 RX ORDER — METHOCARBAMOL 500 MG/1
500 TABLET, FILM COATED ORAL 4 TIMES DAILY PRN
Qty: 60 TABLET | Refills: 1 | Status: SHIPPED | OUTPATIENT
Start: 2025-01-02 | End: 2025-03-03

## 2025-01-02 ASSESSMENT — ENCOUNTER SYMPTOMS
NUMBNESS: 1
DIZZINESS: 0
FEVER: 0
NERVOUS/ANXIOUS: 0
DIFFICULTY URINATING: 0
DYSURIA: 0
HEADACHES: 0
ARTHRALGIAS: 0
EYE REDNESS: 0
APPETITE CHANGE: 0
CONSTIPATION: 0
WEAKNESS: 1
SHORTNESS OF BREATH: 0
EYE PAIN: 0
SORE THROAT: 0
ADENOPATHY: 0
BLOOD IN STOOL: 0
CHILLS: 0
COUGH: 0
ABDOMINAL PAIN: 0
CHEST TIGHTNESS: 0
ABDOMINAL DISTENTION: 0
BACK PAIN: 0
BRUISES/BLEEDS EASILY: 0
DIARRHEA: 0
FATIGUE: 0
DYSPHORIC MOOD: 0

## 2025-01-02 NOTE — PROGRESS NOTES
"Subjective   Patient ID: Kevin Osei is a 49 y.o. male who presents for ER Follow-up.    HPI     Review of Systems    Objective   BP (!) 154/92   Pulse 104   Resp 12   Ht 1.676 m (5' 6\")   Wt 105 kg (231 lb)   SpO2 96%   BMI 37.28 kg/m²     Physical Exam    Assessment/Plan          "

## 2025-01-02 NOTE — PROGRESS NOTES
"Subjective   Patient ID: Jose Osei \"Cele" is a 49 y.o. male who presents for ER Follow-up.  Pt has R upper back pain with numbness and tingling radiating down R arm . R arm feels weaker and numbness affects the whole hand. Onset was ~2.5weeks ago.  He went to [facility] ER 12/15 after waking with the symptoms the day prior. Denies injury or strain.   Pt reports symptoms are improving in terms of pain but not with numbness and tingling, worst  in hand.   Pt has tried Ibuprofen for treatment with some improvement.     ER Follow-up  Associated symptoms include numbness and weakness. Pertinent negatives include no abdominal pain, arthralgias, chest pain, chills, congestion, coughing, fatigue, fever, headaches, rash or sore throat.       Review of Systems   Constitutional:  Negative for appetite change, chills, fatigue and fever.   HENT:  Negative for congestion, hearing loss and sore throat.    Eyes:  Negative for pain, redness and visual disturbance.   Respiratory:  Negative for cough, chest tightness and shortness of breath.    Cardiovascular:  Negative for chest pain and leg swelling.   Gastrointestinal:  Negative for abdominal distention, abdominal pain, blood in stool, constipation and diarrhea.   Genitourinary:  Negative for difficulty urinating and dysuria.   Musculoskeletal:  Negative for arthralgias and back pain.   Skin:  Negative for rash.   Neurological:  Positive for weakness and numbness. Negative for dizziness and headaches.   Hematological:  Negative for adenopathy. Does not bruise/bleed easily.   Psychiatric/Behavioral:  Negative for dysphoric mood. The patient is not nervous/anxious.        Objective   /84   Pulse 104   Resp 12   Ht 1.676 m (5' 6\")   Wt 105 kg (231 lb)   SpO2 96%   BMI 37.28 kg/m²    Physical Exam  Constitutional:       General: He is not in acute distress.     Appearance: Normal appearance.   Cardiovascular:      Rate and Rhythm: Normal rate and regular rhythm.      " Heart sounds: Normal heart sounds. No murmur heard.  Pulmonary:      Effort: Pulmonary effort is normal.      Breath sounds: Normal breath sounds.   Abdominal:      Palpations: Abdomen is soft.      Tenderness: There is no abdominal tenderness.   Musculoskeletal:      Comments: C-spine is nontender with no step offs. Passive ROM on rotation, flexion and extension produces no  pain. Spurlings sign is negative b/l. Strength and sensation is decreased in R compared to L on grasp and interossei muscles.   Neurological:      Mental Status: He is alert.   Psychiatric:         Mood and Affect: Mood normal.         Judgment: Judgment normal.           Assessment/Plan   Diagnoses and all orders for this visit:  Upper back pain on right side/Right arm weakness/Numbness and tingling  - check XR and referring to PMR. For now continue Ibuprofen and gving refill on Robaxin to take as needed.    Follow up as planned

## 2025-01-10 ENCOUNTER — TELEPHONE (OUTPATIENT)
Dept: PRIMARY CARE | Facility: CLINIC | Age: 50
End: 2025-01-10
Payer: COMMERCIAL

## 2025-01-10 NOTE — TELEPHONE ENCOUNTER
----- Message from Geoff Lora sent at 1/4/2025  9:42 AM EST -----  XR shows arthritic changes in c-spine, keep plan to see PMR  ----- Message -----  From: Interface, Radiology Results In  Sent: 1/4/2025   9:22 AM EST  To: Geoff Lora MD

## 2025-02-07 ENCOUNTER — APPOINTMENT (OUTPATIENT)
Dept: PHYSICAL MEDICINE AND REHAB | Facility: CLINIC | Age: 50
End: 2025-02-07
Payer: COMMERCIAL

## 2025-02-07 VITALS — DIASTOLIC BLOOD PRESSURE: 79 MMHG | HEART RATE: 87 BPM | RESPIRATION RATE: 20 BRPM | SYSTOLIC BLOOD PRESSURE: 164 MMHG

## 2025-02-07 DIAGNOSIS — M47.812 CERVICAL SPONDYLOSIS: ICD-10-CM

## 2025-02-07 DIAGNOSIS — R20.2 NUMBNESS AND TINGLING OF RIGHT ARM: ICD-10-CM

## 2025-02-07 DIAGNOSIS — R20.0 NUMBNESS AND TINGLING OF RIGHT ARM: ICD-10-CM

## 2025-02-07 DIAGNOSIS — M54.9 UPPER BACK PAIN ON RIGHT SIDE: ICD-10-CM

## 2025-02-07 DIAGNOSIS — M54.12 RIGHT CERVICAL RADICULOPATHY: Primary | ICD-10-CM

## 2025-02-07 DIAGNOSIS — R29.898 RIGHT ARM WEAKNESS: ICD-10-CM

## 2025-02-07 PROCEDURE — 99204 OFFICE O/P NEW MOD 45 MIN: CPT | Performed by: PHYSICAL MEDICINE & REHABILITATION

## 2025-02-07 RX ORDER — MELOXICAM 15 MG/1
15 TABLET ORAL DAILY PRN
Qty: 30 TABLET | Refills: 1 | Status: SHIPPED | OUTPATIENT
Start: 2025-02-07 | End: 2025-03-09

## 2025-02-07 ASSESSMENT — PAIN SCALES - GENERAL: PAINLEVEL_OUTOF10: 1

## 2025-02-07 NOTE — PROGRESS NOTES
Physical Medicine and Rehabilitation [facility] Consultation   02/07/25    Chief Complaint:  Neck Pain     HPI:  Jose Osei is a 49 y.o. old R handed male who presents to the clinic today at the request of pcp for evaluation of neck pain.  Onset: mid December was pulling kids on a sled a week before the pain started    Location: posterior shoulder blade  Radiation: forearm lateral and hand and digits 4/5  Quality: numbness and tingling  Duration: constant   Aggravating factors:  washing dishes, being more active  Alleviating factors: thc, advil  Severity: 1  Numbness/Tingling: Yes   Bowel or bladder incontinence: No   Current medication regimen: robaxin didn't like how it made him breath, advil prn w mild relief     Treatment to date:  Physical therapy: No   Medications taken to date for this complaint include the following:   none  Prior injections: No     Feels R arm is weak, can't fully grasp due to weakness.      Imaging  Reviewed 02/07/25    Xr C spine 1/2025       No acute fracture no subluxation. No pronounced degenerative change.  There is suggestion of mild left-sided neural foraminal narrowing at  C3-C4 and C4-C5.      IMPRESSION:  Mild left-sided neural foraminal narrowing at C3-C4 and C4-C5.      No past medical history on file.     Past Surgical History:   Procedure Laterality Date    HERNIA REPAIR      LASIK Bilateral     VASECTOMY          Patient Active Problem List    Diagnosis Date Noted    Upper back pain on right side 01/02/2025    Right arm weakness 01/02/2025    Numbness and tingling of right arm 01/02/2025    Lipoma of torso 09/12/2024    Digital mucinous cyst of finger 09/12/2024    Class 2 obesity due to excess calories without serious comorbidity with body mass index (BMI) of 37.0 to 37.9 in adult 09/12/2024    Healthcare maintenance 09/01/2023    Asymptomatic microscopic hematuria 06/23/2023    Prediabetes 06/23/2023    Elevated LDL cholesterol level 06/23/2023    Tingling of left upper  extremity 06/15/2023    Daytime somnolence 06/15/2023    Snoring 06/15/2023    Class 2 obesity due to excess calories without serious comorbidity with body mass index (BMI) of 36.0 to 36.9 in adult 06/15/2023    Chronic pain of left lower extremity 06/15/2023    Medical marijuana use 06/15/2023    Screening for prostate cancer 06/15/2023    Screening, lipid 06/15/2023    Gastroesophageal reflux disease without esophagitis 10/20/2022        Family History   Problem Relation Name Age of Onset    Diabetes Mother      Liver disease Maternal Grandmother          Current Outpatient Medications   Medication Sig Dispense Refill    multivitamin tablet Take 1 tablet by mouth once daily.      methocarbamol (Robaxin) 500 mg tablet Take 1 tablet (500 mg) by mouth 4 times a day as needed for muscle spasms. (Patient not taking: Reported on 2/7/2025) 60 tablet 1     No current facility-administered medications for this visit.        No Known Allergies     Social History     Socioeconomic History    Marital status:    Tobacco Use    Smoking status: Never    Smokeless tobacco: Never   Substance and Sexual Activity    Alcohol use: Yes     Alcohol/week: 3.0 standard drinks of alcohol     Types: 3 Standard drinks or equivalent per week    Drug use: Yes     Types: Marijuana   Wife and two kids   [place]   Now works at home  THC daily ; medical thc  Alcohol 3/week  Smokes cigars rarely    Review of Systems  Constitutional:  Denies fever or chills   Eyes:  Denies change in visual acuity   HENT:  Denies nasal congestion or sore throat   Respiratory:  Denies cough or shortness of breath   Cardiovascular:  Denies chest pain or edema   GI:  Denies abdominal pain, nausea, vomiting, bloody stools or diarrhea   :  Denies dysuria   Integument:  Denies rash   Neurologic: as per HPI  MSK: Per above HPI  Endocrine:  Denies polyuria or polydipsia   Lymphatic:  Denies swollen glands   Psychiatric:  Denies depression or anxiety          Physical Exam:  /79 (BP Location: Left arm, Patient Position: Sitting)   Pulse 87   Resp 20     General:  NAD, F    Psychiatric: appropriate mood & affect.   Cardiovascular:  Normal radial pulses; no UE  edema.  Respiratory:  Normal rate; unlabored breathing.  Skin:  Intact; no erythema; no ecchymosis or rash.  Lymphatic:  No lymphadenopathy or lymphedema.  NEURO:  Alert and appropriate.  Speech fluent, conversing appropriately.   Motor:    Rt: SA 5/5, SER 5/5, EE 5/5, EF 5/5, WE 5/5, FDIM 5/5, ADM 5/5    Lt: SA 5/5, SER 5/5, EE 5/5, EF 5/5, WE 5/5, FDIM 5/5, ADM 5/5    Rt: HF 5/5    Lt: HF 5/5  Sensation:     Light touch intact in the b/l C5-T2 dermatomes.     PP: intact in the b/l C5-T2 dermatomes.  Reflexes:     Right: Biceps 2+, brachioradialis 2+, triceps 2+, patellar 2+, achilles 2+    Left: Biceps 2+, brachioradialis 2+, triceps 2+, patellar 2+, achilles 2+     Babinski's downgoing; no clonus     Hoffmans: negative bilateral   Gait: Normal, narrow based gait.  Tandem gait WNL.    MSK:  Inspection of the neck reveals no soft tissue swelling or ecchymoses.   Cervical flexion full; cervical extension to 10°, right rotation full°; left rotation full°.  There is no tenderness over the cervical paraspinals and upper traps.   Special Tests:    Spurling's maneuver:negative     Lhermitte's: negative     Bakody's sign: negative      - bl tinels at elbow bl       Impression:  Jose Osei is a 49 y.o. M w no significant pmh presenting with neck and R arm pain due to cervical radiculopathy, likely C7. Xray w spondylosis.         Plan:  Orders Placed This Encounter   Procedures    Referral to Physical Therapy    1. PT for core posture centralization of symptoms  2. Mobic daily prn discussed no otc nsaids  3. Will hold of on gabapentin, he does use thc regularly prefer not to mix these but if above wo relief will consider low dose  4. If no improvement will consider MRI C spine next    Fu 8 weeks     Thank  you for the consultation.     Naya Restrepo MD      Physical Medicine and Rehabilitation

## 2025-02-07 NOTE — PROGRESS NOTES
Ref by PCP for back pain.  Patient stated it is weakness in right arm and hand.  No know injury.  Xray of right arm no physical therapy at this time.

## 2025-02-20 ENCOUNTER — EVALUATION (OUTPATIENT)
Dept: PHYSICAL THERAPY | Facility: CLINIC | Age: 50
End: 2025-02-20
Payer: COMMERCIAL

## 2025-02-20 DIAGNOSIS — M47.812 CERVICAL SPONDYLOSIS: ICD-10-CM

## 2025-02-20 DIAGNOSIS — R20.2 NUMBNESS AND TINGLING OF RIGHT ARM: ICD-10-CM

## 2025-02-20 DIAGNOSIS — M54.12 RIGHT CERVICAL RADICULOPATHY: Primary | ICD-10-CM

## 2025-02-20 DIAGNOSIS — R29.898 RIGHT ARM WEAKNESS: ICD-10-CM

## 2025-02-20 DIAGNOSIS — R20.0 NUMBNESS AND TINGLING OF RIGHT ARM: ICD-10-CM

## 2025-02-20 PROCEDURE — 97161 PT EVAL LOW COMPLEX 20 MIN: CPT | Mod: GP

## 2025-02-20 PROCEDURE — 97110 THERAPEUTIC EXERCISES: CPT | Mod: GP

## 2025-02-20 ASSESSMENT — ENCOUNTER SYMPTOMS
DEPRESSION: 0
LOSS OF SENSATION IN FEET: 0
OCCASIONAL FEELINGS OF UNSTEADINESS: 0

## 2025-02-20 ASSESSMENT — PATIENT HEALTH QUESTIONNAIRE - PHQ9
2. FEELING DOWN, DEPRESSED OR HOPELESS: NOT AT ALL
SUM OF ALL RESPONSES TO PHQ9 QUESTIONS 1 AND 2: 0
1. LITTLE INTEREST OR PLEASURE IN DOING THINGS: NOT AT ALL

## 2025-02-20 ASSESSMENT — COLUMBIA-SUICIDE SEVERITY RATING SCALE - C-SSRS
2. HAVE YOU ACTUALLY HAD ANY THOUGHTS OF KILLING YOURSELF?: NO
6. HAVE YOU EVER DONE ANYTHING, STARTED TO DO ANYTHING, OR PREPARED TO DO ANYTHING TO END YOUR LIFE?: NO
1. IN THE PAST MONTH, HAVE YOU WISHED YOU WERE DEAD OR WISHED YOU COULD GO TO SLEEP AND NOT WAKE UP?: NO

## 2025-02-20 NOTE — PROGRESS NOTES
Physical Therapy Evaluation    Patient Name: Jose Osei  MRN: [id number]  Today's Date: 2/20/2025  Visit: 1/60  DOS/DOI:  12/15/24  Referred by: Naya Restrepo MD  Time Calculation  Start Time: 1134  Stop Time: 1225  Time Calculation (min): 51 min  PT Evaluation Time Entry  PT Evaluation (Low) Time Entry: 40  PT Therapeutic Procedures Time Entry  Therapeutic Exercise Time Entry: 11                 Diagnosis:   1. Right cervical radiculopathy  Referral to Physical Therapy    Follow Up In Physical Therapy      2. Right arm weakness  Referral to Physical Therapy    Follow Up In Physical Therapy      3. Numbness and tingling of right arm  Referral to Physical Therapy    Follow Up In Physical Therapy      4. Cervical spondylosis  Referral to Physical Therapy    Follow Up In Physical Therapy                PMH  Diabetes, HBP    Precautions  none    HPI  Pt reports to the clinic with N/T into the Rt UE that he has had since mid December.   A week before his symptoms began, he was pulling his sons on the sled in the snow and thinks maybe this aggravated his symptoms but otherwise notes no mechanism of injury.   December 15th pt went to ER due to the intense onset of N/T in the Rt UE and pain in the upper back and Rt shoulder.  He followed up with Dr. Lora on 1/2/25 and had xrays of the neck.  These xrays revealed Mild left-sided neural foraminal narrowing at C3-C4 and C4-C5.  Pt then saw Dr. Naya Restrepo with [facility] on 2/7/25 who referred pt to physical therapy.    SUBJECTIVE  Symptoms/Presentation:  Pain started in Rt shoulder and upper back and progressed down the Rt arm.  Pt has N/T and weakness into Rt UE, feels it most in ulnar nerve distribution, from medial elbow into 4th and 5th digit.  Pt reports his symptoms have mostly stayed the same since the initial onset and haven't gotten any better or worse.  He reports much less pain now, and says his symptoms are  predominantly the N/T in the Rt arm and hand.  He does notice his strength is improving in the Rt UE.    Current:  5/10 intensity of N/T symptoms  into Rt medial forearm and hand,  At worst: 7/10 intensity of symptoms at worst.    Aggravating factors:  Any activity utilizing the Rt UE tends to aggravate N/T symptoms    Alleviating factors: THC helps, has tried muscle relaxers which haven't helped    Current level of function: aggravation of symptoms with cooking, cutting with a knife, taking the trash out, putting on coat/shirt which has improved somewhat but is still challenging.     Previous Level of Function: No issues, pt had no symptoms or issues with the neck or Rt UE prior to onset of symptoms,     Patient's Living Environment: 2 story, basement, neck/Rt UE have caused no issues with home setup,     Patient's Therapy Goals: Pt wants to get his arm back and return to prior level of function      OBJECTIVE  Observation:    -Forward head posture, rounded shoulders    Palpation:   -No tightness or tenderness to palpation noted at neck or Rt shoulder     AROM:    Cervical AROM: (degrees)   Flexion 45   Extension 60   Right Sidebend 45   Left Sidebend 45   Right Rotation 80   Left Rotation 75     Cervical Repetitive Movement Response  Flexion= x10, no change in N/T symptoms  Extension= x10, resolution of N/T symptoms in Rt arm  Retraction= x10,  resolution of N/T symptoms in Rt arm  Protraction= x10, no change in N/T symptoms    Myotomes:  C3= Lt= 5/5 Rt=5/5   C4= Lt= 5/5 Rt=5/5   C5= Lt= 5/5 Rt=5/5   C6= Lt= 5/5 Rt=5/5   C7= Lt= 5/5 Rt=5/5   C8= Lt=5/5  Rt=4+/5   T1= Lt= 5/5 Rt= 4+/5     Neuro:  Biceps reflex=2+ B    Special Tests:  Compression=negative  Spurling's= negative Lt and Rt  Distraction=negative, no relief  Ulnar nerve ULTT=positive  Rt Elbow Tinel's=negative    Joint Mobility:   -Minimal restriction with cervical /UPAs    Outcome Measure:  -quickDASH= 65.91% (40 raw score)    ASSESSMENT  The  patient is a 49 year old male who presents to the clinic with N/T into the Rt medial arm and 4th and 5th digits.  These symptoms began on 12/15/24 and pt went to the ER, he believes this could've been aggravated by pulling his kids on a sled through the snow a week prior.  Pt's main complaint is the N/T he has in the Rt UE but also has postural weakness.  These deficits have made it difficult for pt to perform cooking and dressing ADLs as well as making it increasingly difficult to complete household chores.  Pt had a positive ulnar nerve ULTT on the Rt side and also was able to centralize N/T with repeated cervical extension and chin retractions.  Pt would benefit from skilled therapy to fully centralize N/T symtpoms and improve postural strength and stability needed to return to prior level of function.    Is skilled care required: yes  Rehab potential: good  Clinical presentation:  Stable and/or uncomplicated characteristics       Complexity:   . Low complexity due to patient's clinical presentation being stable and uncomplicated by any significant comorbidities that may affect rehab tolerance and progression.     Personal factors effecting care: none    PLAN  Frequency/duration: 2x/week-6 weeks  Planned Interventions:  MT, therapeutic exercise, modalities prn  Patient/caregiver agrees with POC:  yes    TODAY'S TREATMENT  -Evaluation of the neck completed this session, discussed results of examination and course of therapy  -HEP provided as seen below, reviewed ex's with pt    Access Code: [email or web address]  URL: [email or web address]  Date: 02/20/2025  Prepared by: Fred Salas    Exercises  - Supine Cervical Retraction with Towel  - 1 x daily - 7 x weekly - 3 sets - 10 reps - 3 sec hold  - Seated Scapular Retraction  - 1 x daily - 7 x weekly - 3 sets - 10 reps - 3 sec hold  - Seated Cervical Retraction  - 1 x daily - 7 x weekly - 3 sets - 10 reps - 3 sec hold    Goals:   Active       PT Problem        Pt will be independent with HEP       Start:  02/20/25    Expected End:  02/27/25            Improve quickDASH score to 20.45% (20 raw score) or better       Start:  02/20/25    Expected End:  04/03/25            Pt will be able to resolve N/T symptoms with repeated chin retractions to allow for improved tolerance with household chores       Start:  02/20/25    Expected End:  04/03/25            Pt able to complete cooking and dressing ADLs without aggravation of N/T symptoms       Start:  02/20/25    Expected End:  04/03/25

## 2025-02-25 ENCOUNTER — APPOINTMENT (OUTPATIENT)
Dept: PHYSICAL THERAPY | Facility: CLINIC | Age: 50
End: 2025-02-25
Payer: COMMERCIAL

## 2025-02-27 ENCOUNTER — TREATMENT (OUTPATIENT)
Dept: PHYSICAL THERAPY | Facility: CLINIC | Age: 50
End: 2025-02-27
Payer: COMMERCIAL

## 2025-02-27 DIAGNOSIS — R20.2 NUMBNESS AND TINGLING OF RIGHT ARM: ICD-10-CM

## 2025-02-27 DIAGNOSIS — M47.812 CERVICAL SPONDYLOSIS: ICD-10-CM

## 2025-02-27 DIAGNOSIS — M54.12 RIGHT CERVICAL RADICULOPATHY: ICD-10-CM

## 2025-02-27 DIAGNOSIS — R20.0 NUMBNESS AND TINGLING OF RIGHT ARM: ICD-10-CM

## 2025-02-27 DIAGNOSIS — R29.898 RIGHT ARM WEAKNESS: ICD-10-CM

## 2025-02-27 PROCEDURE — 97110 THERAPEUTIC EXERCISES: CPT | Mod: GP

## 2025-02-27 NOTE — PROGRESS NOTES
Physical Therapy Treatment    Patient Name: Jose Osei  MRN: [id number]  Today's Date: 2/27/2025  Visit 2/60  DOS/DOI:  12/15/24  Referred by: Naya Restrepo MD  Time Calculation  Start Time: 1216  Stop Time: 1259  Time Calculation (min): 43 min     PT Therapeutic Procedures Time Entry  Therapeutic Exercise Time Entry: 43                 Diagnosis:   1. Right arm weakness  Follow Up In Physical Therapy      2. Numbness and tingling of right arm  Follow Up In Physical Therapy      3. Right cervical radiculopathy  Follow Up In Physical Therapy      4. Cervical spondylosis  Follow Up In Physical Therapy            PRECAUTIONS:  none    SUBJECTIVE:  Pt reports he still has N/T into the Rt UE prior to today's session but reports things are going well overall .  Still happens throughout the day.  Reports chin tucks haven't totally alleviated N/T but he still feels he is getting a good workout with them and they work a decent amount.  He also notices he is able to do more with his Rt hand than he was prior to starting chin tucks like squeezing a long lighter to light the fire place.      HEP compliance: yes, has been doing his exercises    OBJECTIVE:  -Repeated cervical retractions in supine continue to alleviate N/T in Rt UE.    Hand MMT:  -Finger abduction/adduction= 5/5  -Thumb extension= 5/5    Treatment: initiated scapular stability ex's this session    Therapeutic Exercise  Therapeutic Exercise Activity 1: Chin tucks, 3x10 at beginning and end of session  Therapeutic Exercise Activity 2: Prone scapular retractions, 3x10  Therapeutic Exercise Activity 3: Shoulder flex/abd, 2x12, 5 lbs  Therapeutic Exercise Activity 4: Rows, 2x12, 50lbs  Therapeutic Exercise Activity 5: Shoulder extensions, 2x12, 50 lbs  Therapeutic Exercise Activity 6: Lat pull downs, 2x12, 50 lbs.  Therapeutic Exercise Activity 7: Shoulder IR, 2x12, 30lbs B  Therapeutic Exercise Activity 8: Shoulder ER, 2x12, 25lbs.  B  Therapeutic Exercise Activity 9: Standing shoulder abduction, 2x12, Green tb  Therapeutic Exercise Activity 10: Standing diagonal pulls, 2x12 B,  green tb  Therapeutic Exercise Activity 11: ER pull aparts, 2x12, green tb  Therapeutic Exercise Activity 12: Serratus punches, 2x12, B, 5 lbs.                ASSESSMENT:  Pt tolerated the initiation of scapular stability ex's well this session.  He reports being appropriately challenged and fatigued by these ex's and likes feeling that his UE muscles are working again.  Pt reports some N/T into the Rt UE at the end of today's session but less intense than it has been in the past.  Pt would benefit from further skilled therapy to improve scapular strength/stability and to decrease N/T symptoms into Rt UE to allow for return to prior level of function.    Active       PT Problem       Pt will be independent with HEP (Met)       Start:  02/20/25    Expected End:  02/27/25    Resolved:  02/27/25         Improve quickDASH score to 20.45% (20 raw score) or better       Start:  02/20/25    Expected End:  04/03/25            Pt will be able to resolve N/T symptoms with repeated chin retractions to allow for improved tolerance with household chores       Start:  02/20/25    Expected End:  04/03/25            Pt able to complete cooking and dressing ADLs without aggravation of N/T symptoms       Start:  02/20/25    Expected End:  04/03/25              PLAN: continue with scapular stability ex's, progress as tolerated, add prone T's

## 2025-03-04 ENCOUNTER — TREATMENT (OUTPATIENT)
Dept: PHYSICAL THERAPY | Facility: CLINIC | Age: 50
End: 2025-03-04
Payer: COMMERCIAL

## 2025-03-04 DIAGNOSIS — M47.812 CERVICAL SPONDYLOSIS: ICD-10-CM

## 2025-03-04 DIAGNOSIS — R20.0 NUMBNESS AND TINGLING OF RIGHT ARM: ICD-10-CM

## 2025-03-04 DIAGNOSIS — M54.12 RIGHT CERVICAL RADICULOPATHY: ICD-10-CM

## 2025-03-04 DIAGNOSIS — R20.2 NUMBNESS AND TINGLING OF RIGHT ARM: ICD-10-CM

## 2025-03-04 DIAGNOSIS — R29.898 RIGHT ARM WEAKNESS: ICD-10-CM

## 2025-03-04 PROCEDURE — 97110 THERAPEUTIC EXERCISES: CPT | Mod: GP

## 2025-03-04 NOTE — PROGRESS NOTES
Physical Therapy Treatment    Patient Name: Jose Osei  MRN: [id number]  Today's Date: 3/4/2025  Visit 3/60  DOS/DOI:  12/15/24  Referred by: Naya Restrepo MD  Time Calculation  Start Time: 1045  Stop Time: 1130  Time Calculation (min): 45 min     PT Therapeutic Procedures Time Entry  Therapeutic Exercise Time Entry: 45                 Diagnosis:   1. Right arm weakness  Follow Up In Physical Therapy      2. Numbness and tingling of right arm  Follow Up In Physical Therapy      3. Right cervical radiculopathy  Follow Up In Physical Therapy      4. Cervical spondylosis  Follow Up In Physical Therapy            PRECAUTIONS:  none    SUBJECTIVE:  Pt reports things are going well with exercises and continues to have N/T into the Rt UE.  Pt reports this intensity of symptoms with N/T into the Rt UE as 7/10 prior to today's session. He reports he's able to do more but the symptoms come and go at random and he says they felt more intense in the past.  He notices chin tucks work better to alleviate his N/T when done in supine versus sitting.    HEP compliance: yes    OBJECTIVE:  Scapular MMT  Mid trap= Lt= 5/5 Rt= 4/5  Low trap= Lt= 5/5 Rt= 4-/5    Treatment: continued with scapular stability ex's this session, added scapular wall slides and prone T's and Y's, was able to progress reps with all of his ex's this session    Therapeutic Exercise  Therapeutic Exercise Activity 1: Chin tucks supine, 3x10 at beginning and end of session  Therapeutic Exercise Activity 2: Prone scapular retractions, 3x10  Therapeutic Exercise Activity 3: Shoulder flex/abd, 3x10, 5 lbs  Therapeutic Exercise Activity 4: Rows, 3x10, 50lbs  Therapeutic Exercise Activity 5: Shoulder extensions, 3X10, 50 lbs  Therapeutic Exercise Activity 6: Lat pull downs, 3x10, 50 lbs.  Therapeutic Exercise Activity 9: Standing shoulder horizontal abduction, 3x10, Green tb  Therapeutic Exercise Activity 10: Standing diagonal pulls, 3x10 B, green  tb  Therapeutic Exercise Activity 11: ER pull aparts, 3x10, green tb  Therapeutic Exercise Activity 12: Serratus punches, 3x10, B, 5 lbs.  Therapeutic Exercise Activity 13: Prone T's/Y's, 2x12  Therapeutic Exercise Activity 14: Scapular wall slides, 2x12                ASSESSMENT:  Pt tolerated today's session well and reports a good workout with no tingling into the Rt UE at the end of the session.  Pt continues to alleviate N/T with supine chin tucks and reports less relief with seated chin tucks, educated pt on doing more chin tucks in supine at home for more consistent relief.  Pt was able to progress reps with all ex's this session and prone T's and Y's were added. Pt reports both of these as a good challenge with mild irritation of N/T symptoms.  Pt would benefit from continued skilled therapy to alleviate N/T and improve scapular stability to allow for return to pain free prior level of function.        Active       PT Problem       Pt will be independent with HEP (Met)       Start:  02/20/25    Expected End:  02/27/25    Resolved:  02/27/25         Improve quickDASH score to 20.45% (20 raw score) or better (Progressing)       Start:  02/20/25    Expected End:  04/03/25            Pt will be able to resolve N/T symptoms with repeated chin retractions to allow for improved tolerance with household chores (Progressing)       Start:  02/20/25    Expected End:  04/03/25            Pt able to complete cooking and dressing ADLs without aggravation of N/T symptoms (Progressing)       Start:  02/20/25    Expected End:  04/03/25              PLAN: continue with scapular stability ex's progress as tolerated

## 2025-03-06 ENCOUNTER — TREATMENT (OUTPATIENT)
Dept: PHYSICAL THERAPY | Facility: CLINIC | Age: 50
End: 2025-03-06
Payer: COMMERCIAL

## 2025-03-06 DIAGNOSIS — M54.12 RIGHT CERVICAL RADICULOPATHY: ICD-10-CM

## 2025-03-06 DIAGNOSIS — M47.812 CERVICAL SPONDYLOSIS: ICD-10-CM

## 2025-03-06 DIAGNOSIS — R20.0 NUMBNESS AND TINGLING OF RIGHT ARM: ICD-10-CM

## 2025-03-06 DIAGNOSIS — R20.2 NUMBNESS AND TINGLING OF RIGHT ARM: ICD-10-CM

## 2025-03-06 DIAGNOSIS — R29.898 RIGHT ARM WEAKNESS: ICD-10-CM

## 2025-03-06 PROCEDURE — 97110 THERAPEUTIC EXERCISES: CPT | Mod: GP

## 2025-03-11 ENCOUNTER — TREATMENT (OUTPATIENT)
Dept: PHYSICAL THERAPY | Facility: CLINIC | Age: 50
End: 2025-03-11
Payer: COMMERCIAL

## 2025-03-11 DIAGNOSIS — M47.812 CERVICAL SPONDYLOSIS: ICD-10-CM

## 2025-03-11 DIAGNOSIS — R20.0 NUMBNESS AND TINGLING OF RIGHT ARM: ICD-10-CM

## 2025-03-11 DIAGNOSIS — R29.898 RIGHT ARM WEAKNESS: ICD-10-CM

## 2025-03-11 DIAGNOSIS — M54.12 RIGHT CERVICAL RADICULOPATHY: ICD-10-CM

## 2025-03-11 DIAGNOSIS — R20.2 NUMBNESS AND TINGLING OF RIGHT ARM: ICD-10-CM

## 2025-03-11 PROCEDURE — 97110 THERAPEUTIC EXERCISES: CPT | Mod: GP

## 2025-03-11 NOTE — PROGRESS NOTES
Physical Therapy Treatment    Patient Name: Jose Osei  MRN: [id number]  Today's Date: 3/11/2025  Visit 5/60  DOS/DOI:  12/15/24  Referred by: Naya Restrepo MD  Time Calculation  Start Time: 1000  Stop Time: 1043  Time Calculation (min): 43 min     PT Therapeutic Procedures Time Entry  Therapeutic Exercise Time Entry: 43                 Diagnosis:   1. Right arm weakness  Follow Up In Physical Therapy      2. Numbness and tingling of right arm  Follow Up In Physical Therapy      3. Right cervical radiculopathy  Follow Up In Physical Therapy      4. Cervical spondylosis  Follow Up In Physical Therapy            PRECAUTIONS:  none    SUBJECTIVE: Pt reports he still has some tingling into the Rt UE occasionally but notices it has been happening less frequently. He reports no tingling into the Rt arm prior to today's session.  Reports he felt pretty good after last session and his ex's are going well at home.         HEP compliance: yes    OBJECTIVE:  Scapular MMT  Mid trap= Rt= 4+/5  Low trap= Rt= 4/5     Treatment: continued with scapular stability ex's, progressed serratus punches, flex/abd, and cable ex's    Therapeutic Exercise  Therapeutic Exercise Activity 1: Chin tucks supine, 3x10 at beginning and end of session  Therapeutic Exercise Activity 2: Prone scapular retractions, 3x10  Therapeutic Exercise Activity 3: Shoulder flex/abd, 3x10, 6 lbs  Therapeutic Exercise Activity 4: Rows, 3x10, 55lbs  Therapeutic Exercise Activity 5: Shoulder extensions, 3X10, 55 lbs  Therapeutic Exercise Activity 6: Lat pull downs, 3x10, 55 lbs.  Therapeutic Exercise Activity 9: Standing shoulder horizontal abduction, 3x10, Green tb  Therapeutic Exercise Activity 10: Standing diagonal pulls, 3x10 B, green tb  Therapeutic Exercise Activity 12: Serratus punches, 3x10, B, 6 lbs.  Therapeutic Exercise Activity 13: Prone T's/Y's, 3x10, B  Therapeutic Exercise Activity 14: Scapular wall slides, 3x10, light blue tb                 ASSESSMENT:  Pt tolerated today's session well and reports good level of fatigue afterwards with slight tingling in the Rt UE.  Pt shows improvements in scapular strength this session and reports he can feel he is getting stronger with these motions.  Pt was able to progress serratus punches, flex/abd, and cable ex's this session and reports good challenge with these progressions.  Pt would benefit from further skilled therapy to further decrease tingling into Rt UE and to improve scapular strength to allow for return to previous level of function.      Active       PT Problem       Pt will be independent with HEP (Met)       Start:  02/20/25    Expected End:  02/27/25    Resolved:  02/27/25         Improve quickDASH score to 20.45% (20 raw score) or better (Progressing)       Start:  02/20/25    Expected End:  04/03/25            Pt will be able to resolve N/T symptoms with repeated chin retractions to allow for improved tolerance with household chores (Progressing)       Start:  02/20/25    Expected End:  04/03/25            Pt able to complete cooking and dressing ADLs without aggravation of N/T symptoms (Progressing)       Start:  02/20/25    Expected End:  04/03/25              PLAN: continue with scapular stability ex's, progress as tolerated, add elevation in scaption

## 2025-03-13 ENCOUNTER — TREATMENT (OUTPATIENT)
Dept: PHYSICAL THERAPY | Facility: CLINIC | Age: 50
End: 2025-03-13
Payer: COMMERCIAL

## 2025-03-13 DIAGNOSIS — M54.12 RIGHT CERVICAL RADICULOPATHY: Primary | ICD-10-CM

## 2025-03-13 DIAGNOSIS — R20.2 NUMBNESS AND TINGLING OF RIGHT ARM: ICD-10-CM

## 2025-03-13 DIAGNOSIS — R29.898 RIGHT ARM WEAKNESS: ICD-10-CM

## 2025-03-13 DIAGNOSIS — R20.0 NUMBNESS AND TINGLING OF RIGHT ARM: ICD-10-CM

## 2025-03-13 DIAGNOSIS — M47.812 CERVICAL SPONDYLOSIS: ICD-10-CM

## 2025-03-13 PROCEDURE — 97110 THERAPEUTIC EXERCISES: CPT | Mod: GP

## 2025-03-13 NOTE — PROGRESS NOTES
Physical Therapy Treatment    Patient Name: Jose Osei  MRN: [id number]  Today's Date: 3/13/2025  Visit 6/60  DOS/DOI:  12/15/24  Referred by: Naya Restrepo MD  Time Calculation  Start Time: 1000  Stop Time: 1042  Time Calculation (min): 42 min     PT Therapeutic Procedures Time Entry  Therapeutic Exercise Time Entry: 42                 Diagnosis:   1. Right cervical radiculopathy  Follow Up In Physical Therapy      2. Cervical spondylosis  Follow Up In Physical Therapy      3. Numbness and tingling of right arm  Follow Up In Physical Therapy      4. Right arm weakness  Follow Up In Physical Therapy            PRECAUTIONS:  None    SUBJECTIVE:  Pt reports things are going well overall, he still continues to have tingling here and there in the Rt arm which he reports has been less frequent.  He says intensity has fluctuated and he still has some days where the symptoms are more intense.  He reports he has some mild tingling today prior to the start of today's session into the Rt arm.        HEP compliance: yes, he reports exercises are going well at home.     OBJECTIVE:  Scapular MMT  Mid trap= Rt= 4+/5  Low trap= Rt= 4/5     Treatment: continued with scapular stability ex's this session, added elevation in scaption    Therapeutic Exercise  Therapeutic Exercise Activity 1: Chin tucks supine, 3x10 at beginning and end of session  Therapeutic Exercise Activity 2: Prone scapular retractions, 3x10  Therapeutic Exercise Activity 3: Shoulder flex/abd/scaption, 3x10, 6 lbs  Therapeutic Exercise Activity 4: Rows, 3x10, 55lbs  Therapeutic Exercise Activity 5: Shoulder extensions, 3x10, 55 lbs  Therapeutic Exercise Activity 6: Lat pull downs, 3x10, 55 lbs.  Therapeutic Exercise Activity 9: Standing shoulder horizontal abduction, 3x10, Green tb  Therapeutic Exercise Activity 10: Standing diagonal pulls, 3x10 B, green tb  Therapeutic Exercise Activity 11: ER pull aparts, 3x10, green tb  Therapeutic Exercise  Activity 12: Serratus punches, 3x10, B, 6 lbs.  Therapeutic Exercise Activity 13: Prone T's/Y's, 3x10, B  Therapeutic Exercise Activity 14: Scapular wall slides, 3x10, light blue tb                ASSESSMENT:  Pt tolerated today's exercises well and reports feeling good after the session with no tingling into the Rt UE.  Was able to add elevation in scaption which pt reports as a good challenge.  Pt continues to get good muscle burn from theraband exercises.  He would benefit from further skilled therapy to decrease tingling into the Rt UE and improve scapular stability to allow for return to prior level of function.          Active       PT Problem       Pt will be independent with HEP (Met)       Start:  02/20/25    Expected End:  02/27/25    Resolved:  02/27/25         Improve quickDASH score to 20.45% (20 raw score) or better (Progressing)       Start:  02/20/25    Expected End:  04/03/25            Pt will be able to resolve N/T symptoms with repeated chin retractions to allow for improved tolerance with household chores (Progressing)       Start:  02/20/25    Expected End:  04/03/25            Pt able to complete cooking and dressing ADLs without aggravation of N/T symptoms (Progressing)       Start:  02/20/25    Expected End:  04/03/25              PLAN: Continue with scapular stability ex's, progress as tolerated

## 2025-03-17 ENCOUNTER — APPOINTMENT (OUTPATIENT)
Dept: PHYSICAL THERAPY | Facility: CLINIC | Age: 50
End: 2025-03-17
Payer: COMMERCIAL

## 2025-03-19 ENCOUNTER — TREATMENT (OUTPATIENT)
Dept: PHYSICAL THERAPY | Facility: CLINIC | Age: 50
End: 2025-03-19
Payer: COMMERCIAL

## 2025-03-19 DIAGNOSIS — M47.812 CERVICAL SPONDYLOSIS: ICD-10-CM

## 2025-03-19 DIAGNOSIS — R29.898 RIGHT ARM WEAKNESS: ICD-10-CM

## 2025-03-19 DIAGNOSIS — M54.12 RIGHT CERVICAL RADICULOPATHY: ICD-10-CM

## 2025-03-19 DIAGNOSIS — R20.2 NUMBNESS AND TINGLING OF RIGHT ARM: ICD-10-CM

## 2025-03-19 DIAGNOSIS — R20.0 NUMBNESS AND TINGLING OF RIGHT ARM: ICD-10-CM

## 2025-03-19 PROCEDURE — 97110 THERAPEUTIC EXERCISES: CPT | Mod: GP

## 2025-03-19 NOTE — PROGRESS NOTES
Physical Therapy Treatment    Patient Name: Jose Osei  MRN: [id number]  Today's Date: 3/19/2025  Visit 7/60  DOS/DOI:  12/15/24  Referred by: Naya Restrepo MD  Time Calculation  Start Time: 1300  Stop Time: 1342  Time Calculation (min): 42 min     PT Therapeutic Procedures Time Entry  Therapeutic Exercise Time Entry: 42                 Diagnosis:   1. Right arm weakness  Follow Up In Physical Therapy      2. Numbness and tingling of right arm  Follow Up In Physical Therapy      3. Right cervical radiculopathy  Follow Up In Physical Therapy      4. Cervical spondylosis  Follow Up In Physical Therapy            PRECAUTIONS:  none    SUBJECTIVE:  Pt reports he is doing well prior to today's session.  He still has some tingling into the Rt UE, but sees improvement with certain activities like grabbing his wallet out of his pocket and using a long lighter to light his fireplace.  He also reports feeling a difference in strength and can tell he has made progress with that.    HEP compliance: yes    OBJECTIVE:  Scapular MMT  Mid trap= Rt= 4+/5 Lt=5/5  Low trap= Rt= 4/5 Lt=5/5  Rt side bend-2x10, no change in Rt UE tingling    Treatment: continued with scapular stability ex's this session, added ulnar nerve glide, progressed cable ex's    Therapeutic Exercise  Therapeutic Exercise Activity 1: Chin tucks supine, 3x10  Therapeutic Exercise Activity 2: Prone scapular retractions with chin tuck, 3x10  Therapeutic Exercise Activity 3: Shoulder flex/abd/scaption, 3x10, 6 lbs  Therapeutic Exercise Activity 4: Rows, 3x10, 60lbs  Therapeutic Exercise Activity 5: Shoulder extensions, 3x10, 60 lbs  Therapeutic Exercise Activity 6: Lat pull downs, 3x10, 60 lbs.  Therapeutic Exercise Activity 9: Standing shoulder horizontal abduction, 3x10, Green tb  Therapeutic Exercise Activity 10: Standing diagonal pulls, 3x10 B, green tb  Therapeutic Exercise Activity 11: ER pull aparts, 3x10, green tb  Therapeutic Exercise  Activity 12: Serratus punches, 3x10, B, 6 lbs.  Therapeutic Exercise Activity 13: Prone T's/Y's, 3x10, B  Therapeutic Exercise Activity 14: Scapular wall slides, 3x10, light blue tb  Therapeutic Exercise Activity 15: Rt ulnar nerve flossing, 2x12                ASSESSMENT:  Pt tolerated today's session well and reports decreased tingling in the Rt UE at the end. He was able to add ulnar nerve flossing this session and reports a decrease in intensity with the tingling in the Rt UE with this activity.  He was able to progress his cable ex's and reports a good challenge with these progressions.  Pt would benefit from further skilled therapy to decrease tingling in the Rt UE and improve scapular stability to allow for return to symptom free prior level of function.        Active       PT Problem       Pt will be independent with HEP (Met)       Start:  02/20/25    Expected End:  02/27/25    Resolved:  02/27/25         Improve quickDASH score to 20.45% (20 raw score) or better (Progressing)       Start:  02/20/25    Expected End:  04/03/25            Pt will be able to resolve N/T symptoms with repeated chin retractions to allow for improved tolerance with household chores (Progressing)       Start:  02/20/25    Expected End:  04/03/25            Pt able to complete cooking and dressing ADLs without aggravation of N/T symptoms (Progressing)       Start:  02/20/25    Expected End:  04/03/25              PLAN: continue with scapular stability ex's, progress as tolerated

## 2025-03-21 ENCOUNTER — APPOINTMENT (OUTPATIENT)
Dept: PRIMARY CARE | Facility: CLINIC | Age: 50
End: 2025-03-21
Payer: COMMERCIAL

## 2025-03-21 DIAGNOSIS — Z23 ENCOUNTER FOR IMMUNIZATION: ICD-10-CM

## 2025-03-21 PROCEDURE — 90750 HZV VACC RECOMBINANT IM: CPT | Performed by: FAMILY MEDICINE

## 2025-03-21 PROCEDURE — 90471 IMMUNIZATION ADMIN: CPT | Performed by: FAMILY MEDICINE

## 2025-03-21 PROCEDURE — 1036F TOBACCO NON-USER: CPT | Performed by: FAMILY MEDICINE

## 2025-03-24 ENCOUNTER — TREATMENT (OUTPATIENT)
Dept: PHYSICAL THERAPY | Facility: CLINIC | Age: 50
End: 2025-03-24
Payer: COMMERCIAL

## 2025-03-24 DIAGNOSIS — R29.898 RIGHT ARM WEAKNESS: ICD-10-CM

## 2025-03-24 DIAGNOSIS — R20.2 NUMBNESS AND TINGLING OF RIGHT ARM: ICD-10-CM

## 2025-03-24 DIAGNOSIS — M54.12 RIGHT CERVICAL RADICULOPATHY: ICD-10-CM

## 2025-03-24 DIAGNOSIS — R20.0 NUMBNESS AND TINGLING OF RIGHT ARM: ICD-10-CM

## 2025-03-24 DIAGNOSIS — M47.812 CERVICAL SPONDYLOSIS: ICD-10-CM

## 2025-03-24 PROCEDURE — 97110 THERAPEUTIC EXERCISES: CPT | Mod: GP

## 2025-03-24 NOTE — PROGRESS NOTES
Physical Therapy Treatment    Patient Name: Jose Osei  MRN: [id number]  Today's Date: 3/24/2025  Visit 8/60  DOS/DOI:  12/15/24  Referred by: Naya Restrepo MD  Time calculation  Start Time: 1118  Stop Time: 1202  Time calculation: 44 min  PT Therapeutic Procedures Time Entry  Therapeutic Exercise Time Entry: 44                 Diagnosis:   1. Right arm weakness  Follow Up In Physical Therapy      2. Numbness and tingling of right arm  Follow Up In Physical Therapy      3. Right cervical radiculopathy  Follow Up In Physical Therapy      4. Cervical spondylosis  Follow Up In Physical Therapy            PRECAUTIONS:  none    SUBJECTIVE:  He reports everything is going well with the neck and his Rt UE symptoms today.  He reports he still has some tingling overall but reports it has been less intense since addition of ulnar nerve glides.  He reports exercises continue to go well at home.  He reports the intensity of his tingling in the Rt UE as a 4/10 which he says would've been around a 9/10 when he started therapy.    HEP compliance: yes    OBJECTIVE:  Scapular MMT   Mid trap= Rt= 5/5   Low trap= Rt= 4+/5    Treatment: continue with scapular stability ex's this session    Therapeutic Exercise  Therapeutic Exercise Activity 2: Prone scapular retractions with chin tuck, 3x10  Therapeutic Exercise Activity 3: Shoulder flex/abd/scaption, 3x10, 6 lbs  Therapeutic Exercise Activity 4: Rows, 3x10, 60lbs  Therapeutic Exercise Activity 5: Shoulder extensions, 3x10, 60 lbs  Therapeutic Exercise Activity 6: Lat pull downs, 3x10, 60 lbs.  Therapeutic Exercise Activity 9: Standing shoulder horizontal abduction, 3x10, Green tb  Therapeutic Exercise Activity 10: Standing diagonal pulls, 3x10 B, green tb  Therapeutic Exercise Activity 11: ER pull aparts, 3x10, green tb  Therapeutic Exercise Activity 12: Serratus punches, 3x10, B, 6 lbs.  Therapeutic Exercise Activity 13: Prone Y's, 3x10, B  Therapeutic Exercise  Activity 15: Rt ulnar nerve flossing, 3x10                ASSESSMENT:  Pt tolerated today's session well and reports no tingling into the Rt UE after today's exercises.  He continues to report good challenge with his exercises.  He shows improvements with Rt sided scapular strength this session.  Pt would benefit from continued skilled therapy to eliminate tingling into Rt UE and to improve scapular strength to allow for return to prior of level of function.      Active       PT Problem       Pt will be independent with HEP (Met)       Start:  02/20/25    Expected End:  02/27/25    Resolved:  02/27/25         Improve quickDASH score to 20.45% (20 raw score) or better (Progressing)       Start:  02/20/25    Expected End:  04/03/25            Pt will be able to resolve N/T symptoms with repeated chin retractions to allow for improved tolerance with household chores (Progressing)       Start:  02/20/25    Expected End:  04/03/25            Pt able to complete cooking and dressing ADLs without aggravation of N/T symptoms (Progressing)       Start:  02/20/25    Expected End:  04/03/25              PLAN: continue with scapular stability ex's next session, progress as tolerated

## 2025-03-26 ENCOUNTER — TREATMENT (OUTPATIENT)
Dept: PHYSICAL THERAPY | Facility: CLINIC | Age: 50
End: 2025-03-26
Payer: COMMERCIAL

## 2025-03-26 DIAGNOSIS — M47.812 CERVICAL SPONDYLOSIS: ICD-10-CM

## 2025-03-26 DIAGNOSIS — M54.12 RIGHT CERVICAL RADICULOPATHY: ICD-10-CM

## 2025-03-26 DIAGNOSIS — R20.0 NUMBNESS AND TINGLING OF RIGHT ARM: ICD-10-CM

## 2025-03-26 DIAGNOSIS — R20.2 NUMBNESS AND TINGLING OF RIGHT ARM: ICD-10-CM

## 2025-03-26 DIAGNOSIS — R29.898 RIGHT ARM WEAKNESS: ICD-10-CM

## 2025-03-26 PROCEDURE — 97110 THERAPEUTIC EXERCISES: CPT | Mod: GP

## 2025-03-26 NOTE — PROGRESS NOTES
Physical Therapy Treatment    Patient Name: Jose Osei  MRN: [id number]  Today's Date: 3/26/2025  Visit 9/60  DOS/DOI:  12/15/24  Referred by: Naya Restrepo MD  Time Calculation  Start Time: 1117  Stop Time: 1201  Time Calculation (min): 44 min     PT Therapeutic Procedures Time Entry  Therapeutic Exercise Time Entry: 44                 Diagnosis:   1. Right arm weakness  Follow Up In Physical Therapy      2. Numbness and tingling of right arm  Follow Up In Physical Therapy      3. Right cervical radiculopathy  Follow Up In Physical Therapy      4. Cervical spondylosis  Follow Up In Physical Therapy            PRECAUTIONS:  none    SUBJECTIVE:  Pt reports things are going well.  He feels like he continues to make progress with his tingling and numbness into the Rt hand but it continues to come and go. He reports the tingling continues to be less intense.  He reports exercises are still going well and notices improvements in strength.    HEP compliance: yes    OBJECTIVE:  Scapular MMT  Low Trap= Rt= 4+/5  Hand  dynamometer= Rt=60 lbs  Lt=75 lbs    Treatment: continued with scapular stability exercises, able to progress weight with cable exercises, punches, flex/abd, able to progress band exercises to black tb    Therapeutic Exercise  Therapeutic Exercise Activity 1: Chin tucks supine, 3x10  Therapeutic Exercise Activity 3: Shoulder flex/abd, 3x10, 7 lbs  Therapeutic Exercise Activity 4: Rows, 3x10, 65lbs  Therapeutic Exercise Activity 5: Shoulder extensions, 3x10, 65 lbs  Therapeutic Exercise Activity 6: Lat pull downs, 3x10, 65 lbs.  Therapeutic Exercise Activity 9: Standing shoulder horizontal abduction, 3x10, black tb  Therapeutic Exercise Activity 10: Standing diagonal pulls, 3x10 B, black tb  Therapeutic Exercise Activity 11: ER pull aparts, 3x10, black tb  Therapeutic Exercise Activity 12: Serratus punches, 3x10, B, 7 lbs.  Therapeutic Exercise Activity 13: Prone T's/Y's, 3x10,  B  Therapeutic Exercise Activity 15: Rt ulnar nerve flossing, 3x10  Therapeutic Exercise Activity 16: Upright rows, 3x10, black therband                ASSESSMENT: Pt tolerated today's session well and reports no tingling into the Rt hand or aggravation of pain.  He was able to progress most of his exercises this session and reports good challenge with all of these progressions.  He continues to report less intensity with the tingling in his Rt hand with combination of chin retractions and ulnar nerve glides.  Pt would benefit from further skilled therapy to fully resolve Rt UE tingling and to improve scapular strength to allow for return to prior level of function.    Active       PT Problem       Pt will be independent with HEP (Met)       Start:  02/20/25    Expected End:  02/27/25    Resolved:  02/27/25         Improve quickDASH score to 20.45% (20 raw score) or better (Progressing)       Start:  02/20/25    Expected End:  04/03/25            Pt will be able to resolve N/T symptoms with repeated chin retractions to allow for improved tolerance with household chores (Progressing)       Start:  02/20/25    Expected End:  04/03/25            Pt able to complete cooking and dressing ADLs without aggravation of N/T symptoms (Progressing)       Start:  02/20/25    Expected End:  04/03/25              PLAN: continue with scapular stability exercises, progress as tolerated

## 2025-03-31 ENCOUNTER — TREATMENT (OUTPATIENT)
Dept: PHYSICAL THERAPY | Facility: CLINIC | Age: 50
End: 2025-03-31
Payer: COMMERCIAL

## 2025-03-31 DIAGNOSIS — M54.12 RIGHT CERVICAL RADICULOPATHY: ICD-10-CM

## 2025-03-31 DIAGNOSIS — R20.2 NUMBNESS AND TINGLING OF RIGHT ARM: ICD-10-CM

## 2025-03-31 DIAGNOSIS — R20.0 NUMBNESS AND TINGLING OF RIGHT ARM: ICD-10-CM

## 2025-03-31 DIAGNOSIS — R29.898 RIGHT ARM WEAKNESS: Primary | ICD-10-CM

## 2025-03-31 DIAGNOSIS — M47.812 CERVICAL SPONDYLOSIS: ICD-10-CM

## 2025-03-31 PROCEDURE — 97110 THERAPEUTIC EXERCISES: CPT | Mod: GP

## 2025-03-31 NOTE — PROGRESS NOTES
Physical Therapy Treatment    Patient Name: Jose Osei  MRN: [id number]  Today's Date: 3/31/2025  Visit 10/60  DOS/DOI:  12/15/24  Referred by: Naya Restrepo MD  Time Calculation  Start Time: 0917  Stop Time: 1000  Time Calculation (min): 43 min     PT Therapeutic Procedures Time Entry  Therapeutic Exercise Time Entry: 43                 Diagnosis:   1. Right arm weakness  Follow Up In Physical Therapy      2. Numbness and tingling of right arm  Follow Up In Physical Therapy      3. Right cervical radiculopathy  Follow Up In Physical Therapy      4. Cervical spondylosis  Follow Up In Physical Therapy            PRECAUTIONS:  none    SUBJECTIVE:  Pt reports things are going well with the neck and his Rt UE tingling.  He reports the last few days he has only had tingling in the Rt hand when he has really done a lot of work with it.  He reports he was helping set up bookshelves around the house over the weekend and had minimal tingling which he was able to calm with nerve glides.  He reports no tingling or pain prior to today's session.      HEP compliance: yes    OBJECTIVE:  Scapular MMT  Low Trap= Rt= 5/5    Treatment: continued with scapular stability ex's, added resisted scapular clocks    Therapeutic Exercise  Therapeutic Exercise Activity 1: Chin tucks supine, 3x10  Therapeutic Exercise Activity 3: Shoulder flex/abd/scaption, 3x10, 7 lbs  Therapeutic Exercise Activity 4: Rows, 3x10, 65lbs  Therapeutic Exercise Activity 5: Shoulder extensions, 3x10, 65 lbs  Therapeutic Exercise Activity 6: Lat pull downs, 3x10, 65 lbs.  Therapeutic Exercise Activity 9: Standing shoulder horizontal abduction, 3x10, black tb  Therapeutic Exercise Activity 10: Standing diagonal pulls, 3x10 B, black tb  Therapeutic Exercise Activity 11: ER pull aparts, 3x10, black tb  Therapeutic Exercise Activity 12: Serratus punches, 3x10, B, 7 lbs.  Therapeutic Exercise Activity 13: Prone T's/Y's, 3x10, B  Therapeutic Exercise  Activity 14: Scapular clocks, 2x12, orange tb, B  Therapeutic Exercise Activity 15: Rt ulnar nerve flossing, 3x10                ASSESSMENT:  Pt tolerated today's session well and reports good fatigue with no tingling in the Rt hand at the end.  Pt shows improvements in low trap MMT this session.  He continues to report a good challenge with exercises given and reports no aggravation of tingling with the exercises performed today.  Pt continues to make improvements in strength and recently has had improvements in the tingling in his Rt hand.  Due to these improvements anticipate discharge to [facility] in next 1-2 sessions. Pt would benefit from further skilled therapy to decrease Rt UE tingling and improve strength to allow for return to prior level of function.    Active       PT Problem       Pt will be independent with [facility] (Met)       Start:  02/20/25    Expected End:  02/27/25    Resolved:  02/27/25         Improve quickDASH score to 20.45% (20 raw score) or better (Progressing)       Start:  02/20/25    Expected End:  04/03/25            Pt will be able to resolve N/T symptoms with repeated chin retractions to allow for improved tolerance with household chores (Progressing)       Start:  02/20/25    Expected End:  04/03/25            Pt able to complete cooking and dressing ADLs without aggravation of N/T symptoms (Progressing)       Start:  02/20/25    Expected End:  04/03/25              PLAN: continue with scapular stability ex's, progress as tolerated

## 2025-04-04 ENCOUNTER — APPOINTMENT (OUTPATIENT)
Dept: PHYSICAL MEDICINE AND REHAB | Facility: CLINIC | Age: 50
End: 2025-04-04
Payer: COMMERCIAL

## 2025-04-07 ENCOUNTER — TREATMENT (OUTPATIENT)
Dept: PHYSICAL THERAPY | Facility: CLINIC | Age: 50
End: 2025-04-07
Payer: COMMERCIAL

## 2025-04-07 DIAGNOSIS — R29.898 RIGHT ARM WEAKNESS: ICD-10-CM

## 2025-04-07 DIAGNOSIS — R20.0 NUMBNESS AND TINGLING OF RIGHT ARM: ICD-10-CM

## 2025-04-07 DIAGNOSIS — M47.812 CERVICAL SPONDYLOSIS: ICD-10-CM

## 2025-04-07 DIAGNOSIS — M54.12 RIGHT CERVICAL RADICULOPATHY: ICD-10-CM

## 2025-04-07 DIAGNOSIS — R20.2 NUMBNESS AND TINGLING OF RIGHT ARM: ICD-10-CM

## 2025-04-07 PROCEDURE — 97110 THERAPEUTIC EXERCISES: CPT | Mod: GP

## 2025-04-07 NOTE — PROGRESS NOTES
Physical Therapy Treatment/Discharge    Patient Name: Jose Osei  MRN: [id number]  Today's Date: 4/7/2025  Visit 11/60  DOS/DOI:  12/15/24  Referred by: Naya Restrepo MD  Time Calculation  Start Time: 0922  Stop Time: 1002  Time Calculation (min): 40 min     PT Therapeutic Procedures Time Entry  Therapeutic Exercise Time Entry: 40                 Diagnosis:   1. Right arm weakness  Follow Up In Physical Therapy      2. Numbness and tingling of right arm  Follow Up In Physical Therapy      3. Right cervical radiculopathy  Follow Up In Physical Therapy      4. Cervical spondylosis  Follow Up In Physical Therapy            PRECAUTIONS:  none    SUBJECTIVE:  Pt reports things are going well overall. He reports overall he has had minimal tingling over the last couple of weeks.  He really only has tingling when he really overdoes it or does too much actively and he is usually able to settle this tingling with the ulnar nerve glide.  Overall he reports he is ready to graduate to a HEP to continue his progress at home.    HEP compliance: yes    OBJECTIVE:  Scapular MMT:  Mid trap= Rt= 5/5 Lt=5/5  Low trap= Rt= 5/5 Lt=5/5    Myotomes:  C8= Rt=5/5 (4+/5 at eval)  T1= Rt= 5/5 (4+/5 at eval)    Hand  dynamometer= Rt=80 lbs Lt=85 lbs    Ulnar nerve ULTT=negative (positive at eval)    Outcome Measure:  -quickDASH= 6.82% (14 raw score) (at eval 65.91% (40 raw))    Treatment: continued with scapular stability ex's    Therapeutic Exercise  Therapeutic Exercise Activity 1: Chin tucks supine, 3x10  Therapeutic Exercise Activity 3: Shoulder flex/abd, 3x10, 7 lbs  Therapeutic Exercise Activity 9: Standing shoulder horizontal abduction, 3x10, black tb  Therapeutic Exercise Activity 10: Standing diagonal pulls, 3x10 B, black tb  Therapeutic Exercise Activity 11: ER pull aparts, 3x10, black tb  Therapeutic Exercise Activity 12: Serratus punches, 3x10, B, 7 lbs.  Therapeutic Exercise Activity 13: Prone T's/Y's,  3x10, B  Therapeutic Exercise Activity 15: Rt ulnar nerve flossing, 3x10                -Pt provided with HEP as seen below, given variety of exercises to continue with his progress at home    Access Code: [email or web address]  URL: [email or web address]  Date: 04/07/2025  Prepared by: Fred Salas    Exercises  - Supine Cervical Retraction with Towel  - 1 x daily - 2-3 x weekly - 3 sets - 10 reps - 3 sec hold  - Seated Scapular Retraction  - 1 x daily - 2-3 x weekly - 3 sets - 10 reps - 3 sec hold  - Seated Cervical Retraction  - 1 x daily - 2-3 x weekly - 3 sets - 10 reps - 3 sec hold  - Standing Shoulder Horizontal Abduction with Resistance  - 1 x daily - 2-3 x weekly - 3 sets - 10 reps  - Standing Shoulder Row with Anchored Resistance  - 1 x daily - 2-3 x weekly - 3 sets - 10 reps  - Shoulder extension with resistance - Neutral  - 1 x daily - 2-3 x weekly - 3 sets - 10 reps  - Standing Shoulder Diagonal Horizontal Abduction 60/120 Degrees with Resistance  - 1 x daily - 2-3 x weekly - 3 sets - 10 reps  - Shoulder External Rotation with Anchored Resistance  - 1 x daily - 2-3 x weekly - 3 sets - 10 reps  - Shoulder Internal Rotation with Resistance  - 1 x daily - 2-3 x weekly - 3 sets - 10 reps  - Shoulder External Rotation and Scapular Retraction with Resistance  - 1 x daily - 2-3 x weekly - 3 sets - 10 reps  - Standing Shoulder Horizontal Abduction with Resistance  - 1 x daily - 2-3 x weekly - 3 sets - 10 reps  - Standing Shoulder Flexion to 90 Degrees with Dumbbells  - 1 x daily - 2-3 x weekly - 3 sets - 10 reps  - Scaption with Dumbbells  - 1 x daily - 2-3 x weekly - 3 sets - 10 reps  - Shoulder Abduction with Dumbbells - Thumbs Up  - 1 x daily - 2-3 x weekly - 3 sets - 10 reps  - Standing Upright Shoulder Row with Resistance  - 1 x daily - 2-3 x weekly - 3 sets - 10 reps  - Supine Scapular Protraction in Flexion with Dumbbells  - 1 x daily - 2-3 x weekly - 3 sets - 10 reps  - Ulnar Nerve Flossing  -  1 x daily - 2-3 x weekly - 3 sets - 10 reps    ASSESSMENT: Pt tolerated today's session well and reports no tingling at the end of the session.  He continues to report a good challenge with his exercises.  Overall he reports his condition has improved quite a bit and has minimal tingling in the Rt hand lately and even when he does have this tingling he is able to calm it with ulnar nerve glides.  Pt has met or is adequate for discharge with all of his goals and is ready to continue with his progress independently with HEP.    Active       PT Problem       Pt will be independent with HEP (Met)       Start:  02/20/25    Expected End:  02/27/25    Resolved:  02/27/25         Improve quickDASH score to 20.45% (20 raw score) or better (Met)       Start:  02/20/25    Expected End:  04/03/25    Resolved:  04/07/25         Pt will be able to resolve N/T symptoms with repeated chin retractions to allow for improved tolerance with household chores (Adequate for Discharge)       Start:  02/20/25    Expected End:  04/03/25    Resolved:  04/07/25         Pt able to complete cooking and dressing ADLs without aggravation of N/T symptoms (Met)       Start:  02/20/25    Expected End:  04/03/25    Resolved:  04/07/25           PLAN: discharge to [facility]

## 2025-04-09 ENCOUNTER — APPOINTMENT (OUTPATIENT)
Dept: PHYSICAL THERAPY | Facility: CLINIC | Age: 50
End: 2025-04-09
Payer: COMMERCIAL

## 2025-04-11 ENCOUNTER — APPOINTMENT (OUTPATIENT)
Dept: PHYSICAL MEDICINE AND REHAB | Facility: CLINIC | Age: 50
End: 2025-04-11
Payer: COMMERCIAL

## 2025-09-15 ENCOUNTER — APPOINTMENT (OUTPATIENT)
Dept: PRIMARY CARE | Facility: CLINIC | Age: 50
End: 2025-09-15
Payer: COMMERCIAL